# Patient Record
Sex: FEMALE | Race: WHITE | NOT HISPANIC OR LATINO | RURAL
[De-identification: names, ages, dates, MRNs, and addresses within clinical notes are randomized per-mention and may not be internally consistent; named-entity substitution may affect disease eponyms.]

---

## 2021-01-11 LAB
PAP RECOMMENDATION EXT: NORMAL
PAP SMEAR: NORMAL

## 2023-01-03 ENCOUNTER — HOSPITAL ENCOUNTER (EMERGENCY)
Facility: HOSPITAL | Age: 28
Discharge: HOME | End: 2023-01-03
Attending: EMERGENCY MEDICINE

## 2023-01-03 VITALS
OXYGEN SATURATION: 96 % | DIASTOLIC BLOOD PRESSURE: 91 MMHG | RESPIRATION RATE: 14 BRPM | SYSTOLIC BLOOD PRESSURE: 140 MMHG | BODY MASS INDEX: 42.52 KG/M2 | TEMPERATURE: 98 F | WEIGHT: 240 LBS | HEIGHT: 63 IN | HEART RATE: 66 BPM

## 2023-01-03 DIAGNOSIS — R07.9 CHEST PAIN, UNSPECIFIED TYPE: ICD-10-CM

## 2023-01-03 DIAGNOSIS — F41.9 ANXIETY: ICD-10-CM

## 2023-01-03 DIAGNOSIS — F32.A DEPRESSION, UNSPECIFIED DEPRESSION TYPE: Primary | ICD-10-CM

## 2023-01-03 DIAGNOSIS — N39.0 URINARY TRACT INFECTION WITHOUT HEMATURIA, SITE UNSPECIFIED: ICD-10-CM

## 2023-01-03 LAB
ALBUMIN SERPL BCP-MCNC: 3.7 G/DL (ref 3.5–5)
ALBUMIN/GLOB SERPL: 0.9 {RATIO}
ALP SERPL-CCNC: 73 U/L (ref 37–98)
ALT SERPL W P-5'-P-CCNC: 43 U/L (ref 13–56)
ANION GAP SERPL CALCULATED.3IONS-SCNC: 9 MMOL/L (ref 7–16)
AST SERPL W P-5'-P-CCNC: 32 U/L (ref 15–37)
B-HCG UR QL: NEGATIVE
BACTERIA #/AREA URNS HPF: ABNORMAL /HPF
BASOPHILS # BLD AUTO: 0.05 K/UL (ref 0–0.2)
BASOPHILS NFR BLD AUTO: 0.5 % (ref 0–1)
BILIRUB SERPL-MCNC: 0.2 MG/DL (ref ?–1.2)
BILIRUB UR QL STRIP: NEGATIVE
BUN SERPL-MCNC: 8 MG/DL (ref 7–18)
BUN/CREAT SERPL: 15 (ref 6–20)
CALCIUM SERPL-MCNC: 9.3 MG/DL (ref 8.5–10.1)
CHLORIDE SERPL-SCNC: 105 MMOL/L (ref 98–107)
CLARITY UR: ABNORMAL
CO2 SERPL-SCNC: 31 MMOL/L (ref 21–32)
COLOR UR: ABNORMAL
CREAT SERPL-MCNC: 0.55 MG/DL (ref 0.55–1.02)
CTP QC/QA: YES
D DIMER PPP FEU-MCNC: 0.38 ΜG/ML (ref 0–0.47)
DIFFERENTIAL METHOD BLD: ABNORMAL
EGFR (NO RACE VARIABLE) (RUSH/TITUS): 129 ML/MIN/1.73M²
EOSINOPHIL # BLD AUTO: 0.24 K/UL (ref 0–0.5)
EOSINOPHIL NFR BLD AUTO: 2.3 % (ref 1–4)
ERYTHROCYTE [DISTWIDTH] IN BLOOD BY AUTOMATED COUNT: 12.7 % (ref 11.5–14.5)
GLOBULIN SER-MCNC: 3.9 G/DL (ref 2–4)
GLUCOSE SERPL-MCNC: 88 MG/DL (ref 74–106)
GLUCOSE UR STRIP-MCNC: NORMAL MG/DL
HCT VFR BLD AUTO: 39 % (ref 38–47)
HGB BLD-MCNC: 13.1 G/DL (ref 12–16)
IMM GRANULOCYTES # BLD AUTO: 0.01 K/UL (ref 0–0.04)
IMM GRANULOCYTES NFR BLD: 0.1 % (ref 0–0.4)
KETONES UR STRIP-SCNC: NEGATIVE MG/DL
LEUKOCYTE ESTERASE UR QL STRIP: ABNORMAL
LYMPHOCYTES # BLD AUTO: 3.73 K/UL (ref 1–4.8)
LYMPHOCYTES NFR BLD AUTO: 35.9 % (ref 27–41)
MCH RBC QN AUTO: 30 PG (ref 27–31)
MCHC RBC AUTO-ENTMCNC: 33.6 G/DL (ref 32–36)
MCV RBC AUTO: 89.4 FL (ref 80–96)
MONOCYTES # BLD AUTO: 0.68 K/UL (ref 0–0.8)
MONOCYTES NFR BLD AUTO: 6.5 % (ref 2–6)
MPC BLD CALC-MCNC: 9.6 FL (ref 9.4–12.4)
MUCOUS THREADS #/AREA URNS HPF: ABNORMAL /HPF
NEUTROPHILS # BLD AUTO: 5.69 K/UL (ref 1.8–7.7)
NEUTROPHILS NFR BLD AUTO: 54.7 % (ref 53–65)
NITRITE UR QL STRIP: NEGATIVE
NRBC # BLD AUTO: 0 X10E3/UL
NRBC, AUTO (.00): 0 %
PH UR STRIP: 7 PH UNITS
PLATELET # BLD AUTO: 373 K/UL (ref 150–400)
POTASSIUM SERPL-SCNC: 3.9 MMOL/L (ref 3.5–5.1)
PROT SERPL-MCNC: 7.6 G/DL (ref 6.4–8.2)
PROT UR QL STRIP: 10
RBC # BLD AUTO: 4.36 M/UL (ref 4.2–5.4)
RBC # UR STRIP: ABNORMAL /UL
RBC #/AREA URNS HPF: ABNORMAL /HPF
SODIUM SERPL-SCNC: 141 MMOL/L (ref 136–145)
SP GR UR STRIP: 1
SQUAMOUS #/AREA URNS LPF: ABNORMAL /LPF
T4 FREE SERPL-MCNC: 0.72 NG/DL (ref 0.76–1.46)
TRICHOMONAS #/AREA URNS HPF: ABNORMAL /HPF
TROPONIN I SERPL HS-MCNC: 4.7 PG/ML
TSH SERPL DL<=0.005 MIU/L-ACNC: 1.68 UIU/ML (ref 0.36–3.74)
UROBILINOGEN UR STRIP-ACNC: NORMAL MG/DL
WBC # BLD AUTO: 10.4 K/UL (ref 4.5–11)
WBC #/AREA URNS HPF: ABNORMAL /HPF
YEAST #/AREA URNS HPF: ABNORMAL /HPF

## 2023-01-03 PROCEDURE — 85025 COMPLETE CBC W/AUTO DIFF WBC: CPT | Performed by: EMERGENCY MEDICINE

## 2023-01-03 PROCEDURE — 84443 ASSAY THYROID STIM HORMONE: CPT | Performed by: EMERGENCY MEDICINE

## 2023-01-03 PROCEDURE — 85379 FIBRIN DEGRADATION QUANT: CPT | Performed by: EMERGENCY MEDICINE

## 2023-01-03 PROCEDURE — 84439 ASSAY OF FREE THYROXINE: CPT | Performed by: EMERGENCY MEDICINE

## 2023-01-03 PROCEDURE — 96360 HYDRATION IV INFUSION INIT: CPT

## 2023-01-03 PROCEDURE — 81001 URINALYSIS AUTO W/SCOPE: CPT | Performed by: EMERGENCY MEDICINE

## 2023-01-03 PROCEDURE — 99284 EMERGENCY DEPT VISIT MOD MDM: CPT | Mod: 25

## 2023-01-03 PROCEDURE — 36415 COLL VENOUS BLD VENIPUNCTURE: CPT | Performed by: EMERGENCY MEDICINE

## 2023-01-03 PROCEDURE — 81025 URINE PREGNANCY TEST: CPT | Performed by: EMERGENCY MEDICINE

## 2023-01-03 PROCEDURE — 25000003 PHARM REV CODE 250: Performed by: EMERGENCY MEDICINE

## 2023-01-03 PROCEDURE — 80053 COMPREHEN METABOLIC PANEL: CPT | Performed by: EMERGENCY MEDICINE

## 2023-01-03 PROCEDURE — 99284 PR EMERGENCY DEPT VISIT,LEVEL IV: ICD-10-PCS | Mod: ,,, | Performed by: EMERGENCY MEDICINE

## 2023-01-03 PROCEDURE — 99284 EMERGENCY DEPT VISIT MOD MDM: CPT | Mod: ,,, | Performed by: EMERGENCY MEDICINE

## 2023-01-03 PROCEDURE — 84484 ASSAY OF TROPONIN QUANT: CPT | Performed by: EMERGENCY MEDICINE

## 2023-01-03 RX ORDER — CEPHALEXIN 500 MG/1
500 CAPSULE ORAL 2 TIMES DAILY
Qty: 14 CAPSULE | Refills: 0 | Status: SHIPPED | OUTPATIENT
Start: 2023-01-03 | End: 2023-01-10

## 2023-01-03 RX ORDER — BUSPIRONE HYDROCHLORIDE 5 MG/1
5 TABLET ORAL 2 TIMES DAILY
Qty: 60 TABLET | Refills: 1 | Status: SHIPPED | OUTPATIENT
Start: 2023-01-03 | End: 2023-09-05 | Stop reason: SDUPTHER

## 2023-01-03 RX ORDER — HYDROXYZINE HYDROCHLORIDE 25 MG/1
25 TABLET, FILM COATED ORAL 3 TIMES DAILY PRN
Qty: 30 TABLET | Refills: 2 | Status: SHIPPED | OUTPATIENT
Start: 2023-01-03

## 2023-01-03 RX ORDER — CLONAZEPAM 0.5 MG/1
0.5 TABLET ORAL
Status: COMPLETED | OUTPATIENT
Start: 2023-01-03 | End: 2023-01-03

## 2023-01-03 RX ORDER — PROPRANOLOL HYDROCHLORIDE 10 MG/1
10 TABLET ORAL 3 TIMES DAILY PRN
Qty: 90 TABLET | Refills: 11 | Status: SHIPPED | OUTPATIENT
Start: 2023-01-03 | End: 2023-09-05 | Stop reason: SDUPTHER

## 2023-01-03 RX ADMIN — SODIUM CHLORIDE 1000 ML: 9 INJECTION, SOLUTION INTRAVENOUS at 12:01

## 2023-01-03 RX ADMIN — CLONAZEPAM 0.5 MG: 0.5 TABLET ORAL at 12:01

## 2023-01-03 NOTE — DISCHARGE INSTRUCTIONS
Follow-up with primary care with Cardiology.  Talk about her chest pain and your tachycardia spells.  Also follow-up with primary care.    Start BuSpar to help with your mood.    Use propranolol as needed to help with tachycardia with anxiety.  Similarly use hydroxyzine as needed.    Prescribing antibiotic for some evidence of urinary tract infection

## 2023-01-03 NOTE — Clinical Note
"Ashley"Silverio Gonzalez was seen and treated in our emergency department on 1/3/2023.  She may return to work on 01/05/2023.       If you have any questions or concerns, please don't hesitate to call.      EMMA Goldberg    "

## 2023-01-03 NOTE — ED PROVIDER NOTES
Encounter Date: 1/3/2023    SCRIBE #1 NOTE: I, Jyothi Yousif, am scribing for, and in the presence of,  Raz Banks MD. I have scribed the entire note.     History     Chief Complaint   Patient presents with    Chest Pain     Patient is a 27 y.o. female who presents to the emergency department with complaints of chest pain. Patient explains that she has recently been experiencing chest pain and notes that her heart rate greatly increases when standing. Patient describes her pain as a tight, squeezing sensation in addition to sharp stabbing pain. Patient also notes that shortness of breath, light-headedness, and leg swelling. Patient notes job related stress. No other symptoms were reported.    The history is provided by the patient. No  was used.   Review of patient's allergies indicates:   Allergen Reactions    Red (food color) Hives and Itching    Yellow dye Hives and Itching    Quetiapine Anxiety, Diarrhea, Nausea And Vomiting, Other (See Comments), Photosensitivity and Rash     Other reaction(s): Headache     No past medical history on file.  No past surgical history on file.  No family history on file.     Review of Systems   Eyes: Negative.    Respiratory:  Positive for shortness of breath.    Cardiovascular:  Positive for chest pain.   Endocrine: Negative.    Skin: Negative.    Allergic/Immunologic: Negative.    Neurological:  Positive for light-headedness and numbness.   Hematological: Negative.    Psychiatric/Behavioral: Negative.     All other systems reviewed and are negative.    Physical Exam     Initial Vitals [01/03/23 1124]   BP Pulse Resp Temp SpO2   (!) 154/107 (!) 128 (!) 22 97.9 °F (36.6 °C) 100 %      MAP       --         Physical Exam    Nursing note and vitals reviewed.  Constitutional: She appears well-developed and well-nourished.   HENT:   Head: Normocephalic and atraumatic.   Cardiovascular:  Normal rate.           Pulmonary/Chest: Breath sounds normal.    Abdominal: Bowel sounds are normal.     Neurological: She is alert and oriented to person, place, and time.   Skin: Skin is warm and dry.   Psychiatric: She has a normal mood and affect. Thought content normal.       ED Course   Procedures  Labs Reviewed   URINALYSIS, REFLEX TO URINE CULTURE - Abnormal; Notable for the following components:       Result Value    Color, UA Pink (*)     Leukocytes, UA Small (*)     Protein, UA 10 (*)     Blood, UA Large (*)     All other components within normal limits   CBC WITH DIFFERENTIAL - Abnormal; Notable for the following components:    Monocytes % 6.5 (*)     All other components within normal limits   URINALYSIS, MICROSCOPIC - Abnormal; Notable for the following components:    RBC, UA 3-5 (*)     Bacteria, UA Few (*)     Squamous Epithelial Cells, UA Few (*)     All other components within normal limits   TROPONIN I - Normal   D DIMER, QUANTITATIVE - Normal   CBC W/ AUTO DIFFERENTIAL    Narrative:     The following orders were created for panel order CBC auto differential.  Procedure                               Abnormality         Status                     ---------                               -----------         ------                     CBC with Differential[[id number]]        Abnormal            Final result                 Please view results for these tests on the individual orders.   COMPREHENSIVE METABOLIC PANEL   TSH   T4, FREE   POCT URINE PREGNANCY          Imaging Results    None          Medications   clonazePAM tablet 0.5 mg (0.5 mg Oral Given 1/3/23 1250)   sodium chloride 0.9% bolus 1,000 mL 1,000 mL (1,000 mLs Intravenous New Bag 1/3/23 1250)                Attending Attestation:           Physician Attestation for Scribe:  Physician Attestation Statement for Scribe #1: I, Raz Banks MD, reviewed documentation, as scribed by Jyothi Dodd in my presence, and it is both accurate and complete.           ED Course as of 01/03/23 7333   Fili Amin  03, 2023   1351 Patient presents with some lightheadedness on standing and tachycardia.  Patient also very anxious.  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  Patient recently moved here so she is in a new area.  She had some chest tightness with EKG showed normal sinus rhythm no ST elevation.  Also high sensitivity troponin is normal so myocardial infarction very unlikely.  D-dimer also negative so very unlikely to be pulmonary embolism.  Chest x-ray shows no acute abnormality white blood cell count normal so unlikely to be pneumonia.  She did have some mild dysuria as well as few bacteria in the urine and small amount of leukocytes will be treated empirically for urinary tract infection with outpatient antibiotics.  Patient improved in the ED after being treated with Klonopin.  Will prescribe propranolol and hydroxyzine as needed for anxiety.  Will have her follow-up with Cardiology due to her POTS like presentation and her chest pain.  Also will have her follow up with primary care and will start BuSpar for her mood.  She voiced understanding to return if symptoms worsen or new symptoms develop. [PK]      ED Course User Index  [PK] Raz Banks MD                 Clinical Impression:   Final diagnoses:  [F32.A] Depression, unspecified depression type (Primary)  [F41.9] Anxiety  [R07.9] Chest pain, unspecified type  [N39.0] Urinary tract infection without hematuria, site unspecified        ED Disposition Condition    Discharge Stable          ED Prescriptions       Medication Sig Dispense Start Date End Date Auth. Provider    propranoloL (INDERAL) 10 MG tablet Take 1 tablet (10 mg total) by mouth 3 (three) times daily as needed (anxiety). 90 tablet 1/3/2023 1/3/2024 Raz Banks MD    hydrOXYzine HCL (ATARAX) 25 MG tablet Take 1 tablet (25 mg total) by mouth 3 (three) times daily as needed for Itching or Anxiety. 30 tablet 1/3/2023 -- Raz Banks MD     busPIRone (BUSPAR) 5 MG Tab Take 1 tablet (5 mg total) by mouth 2 (two) times daily. 60 tablet 1/3/2023 1/3/2024 Raz Banks MD    cephALEXin (KEFLEX) 500 MG capsule Take 1 capsule (500 mg total) by mouth 2 (two) times a day. for 7 days 14 capsule 1/3/2023 1/10/2023 Raz Banks MD          Follow-up Information       Follow up With Specialties Details Why Contact Info    Anil Rosas DO Family Medicine, Emergency Medicine Schedule an appointment as soon as possible for a visit  As needed [address] [facility] [address]  [phone]      [facility] - Emergency Department Emergency Medicine Go to  As needed, If symptoms worsen [address] [place] [id number] [phone]             Raz Banks MD  01/03/23 4885

## 2023-01-04 ENCOUNTER — TELEPHONE (OUTPATIENT)
Dept: EMERGENCY MEDICINE | Facility: HOSPITAL | Age: 28
End: 2023-01-04

## 2023-01-04 ENCOUNTER — PATIENT MESSAGE (OUTPATIENT)
Dept: EMERGENCY MEDICINE | Facility: HOSPITAL | Age: 28
End: 2023-01-04

## 2023-01-04 NOTE — TELEPHONE ENCOUNTER
----- Message from CARLOS Solis sent at 1/4/2023  8:13 AM CST -----  Please have the patient follow up with her PCP regarding TSH

## 2023-03-15 ENCOUNTER — OFFICE VISIT (OUTPATIENT)
Dept: CARDIOLOGY | Facility: CLINIC | Age: 28
End: 2023-03-15
Payer: COMMERCIAL

## 2023-03-15 VITALS
BODY MASS INDEX: 45 KG/M2 | HEART RATE: 82 BPM | RESPIRATION RATE: 18 BRPM | SYSTOLIC BLOOD PRESSURE: 120 MMHG | HEIGHT: 63 IN | WEIGHT: 254 LBS | DIASTOLIC BLOOD PRESSURE: 85 MMHG

## 2023-03-15 DIAGNOSIS — R07.9 CHEST PAIN, UNSPECIFIED TYPE: ICD-10-CM

## 2023-03-15 DIAGNOSIS — R00.2 PALPITATIONS: Primary | ICD-10-CM

## 2023-03-15 PROCEDURE — 99204 OFFICE O/P NEW MOD 45 MIN: CPT | Mod: S$PBB,,, | Performed by: STUDENT IN AN ORGANIZED HEALTH CARE EDUCATION/TRAINING PROGRAM

## 2023-03-15 PROCEDURE — 3074F SYST BP LT 130 MM HG: CPT | Mod: ,,, | Performed by: STUDENT IN AN ORGANIZED HEALTH CARE EDUCATION/TRAINING PROGRAM

## 2023-03-15 PROCEDURE — 3079F PR MOST RECENT DIASTOLIC BLOOD PRESSURE 80-89 MM HG: ICD-10-PCS | Mod: ,,, | Performed by: STUDENT IN AN ORGANIZED HEALTH CARE EDUCATION/TRAINING PROGRAM

## 2023-03-15 PROCEDURE — 1159F MED LIST DOCD IN RCRD: CPT | Mod: ,,, | Performed by: STUDENT IN AN ORGANIZED HEALTH CARE EDUCATION/TRAINING PROGRAM

## 2023-03-15 PROCEDURE — 99204 PR OFFICE/OUTPT VISIT, NEW, LEVL IV, 45-59 MIN: ICD-10-PCS | Mod: S$PBB,,, | Performed by: STUDENT IN AN ORGANIZED HEALTH CARE EDUCATION/TRAINING PROGRAM

## 2023-03-15 PROCEDURE — 3008F PR BODY MASS INDEX (BMI) DOCUMENTED: ICD-10-PCS | Mod: ,,, | Performed by: STUDENT IN AN ORGANIZED HEALTH CARE EDUCATION/TRAINING PROGRAM

## 2023-03-15 PROCEDURE — 1159F PR MEDICATION LIST DOCUMENTED IN MEDICAL RECORD: ICD-10-PCS | Mod: ,,, | Performed by: STUDENT IN AN ORGANIZED HEALTH CARE EDUCATION/TRAINING PROGRAM

## 2023-03-15 PROCEDURE — 3008F BODY MASS INDEX DOCD: CPT | Mod: ,,, | Performed by: STUDENT IN AN ORGANIZED HEALTH CARE EDUCATION/TRAINING PROGRAM

## 2023-03-15 PROCEDURE — 3074F PR MOST RECENT SYSTOLIC BLOOD PRESSURE < 130 MM HG: ICD-10-PCS | Mod: ,,, | Performed by: STUDENT IN AN ORGANIZED HEALTH CARE EDUCATION/TRAINING PROGRAM

## 2023-03-15 PROCEDURE — 99214 OFFICE O/P EST MOD 30 MIN: CPT | Mod: PBBFAC | Performed by: STUDENT IN AN ORGANIZED HEALTH CARE EDUCATION/TRAINING PROGRAM

## 2023-03-15 PROCEDURE — 3079F DIAST BP 80-89 MM HG: CPT | Mod: ,,, | Performed by: STUDENT IN AN ORGANIZED HEALTH CARE EDUCATION/TRAINING PROGRAM

## 2023-03-15 PROCEDURE — 93246 EXT ECG>7D<15D RECORDING: CPT | Performed by: STUDENT IN AN ORGANIZED HEALTH CARE EDUCATION/TRAINING PROGRAM

## 2023-03-15 RX ORDER — AMITRIPTYLINE HYDROCHLORIDE 10 MG/1
10 TABLET, FILM COATED ORAL NIGHTLY
COMMUNITY
End: 2023-09-13

## 2023-03-15 NOTE — PROGRESS NOTES
PCP: Primary Doctor No    Referring Provider:     Subjective:   Ashley Gonzalez is a 27 y.o. female with hx of obesity, sleep disorder  who presents for evaluation of chest pain and palpitations.     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   Was in the ED having chest pain, SOB and ringing sensation in the ears  Frequency of symptoms of increased. Reports having COVID x 4 and feels it maybe related  Also start Elavil for insomnia (known to cause tachycardia)    Fhx: hx of congenital heart disease  Shx: ex smoker, denies etoh or drug use    EKG 1/3/23 - NSR with sinus arrhyhtmia.       Lab Results   Component Value Date     01/03/2023    K 3.9 01/03/2023     01/03/2023    CO2 31 01/03/2023    BUN 8 01/03/2023    CREATININE 0.55 01/03/2023    CALCIUM 9.3 01/03/2023    ANIONGAP 9 01/03/2023    ESTGFRAFRICA >60 05/07/2022    EGFRNONAA >60 05/07/2022       No results found for: CHOL  No results found for: HDL  No results found for: LDLCALC  No results found for: TRIG  No results found for: CHOLHDL    Lab Results   Component Value Date    WBC 10.40 01/03/2023    HGB 13.1 01/03/2023    HCT 39.0 01/03/2023    MCV 89.4 01/03/2023     01/03/2023           Current Outpatient Medications:     amitriptyline (ELAVIL) 10 MG tablet, Take 10 mg by mouth every evening., Disp: , Rfl:     busPIRone (BUSPAR) 5 MG Tab, Take 1 tablet (5 mg total) by mouth 2 (two) times daily., Disp: 60 tablet, Rfl: 1    hydrOXYzine HCL (ATARAX) 25 MG tablet, Take 1 tablet (25 mg total) by mouth 3 (three) times daily as needed for Itching or Anxiety., Disp: 30 tablet, Rfl: 2    propranoloL (INDERAL) 10 MG tablet, Take 1 tablet (10 mg total) by mouth 3 (three) times daily as needed (anxiety)., Disp: 90 tablet, Rfl: 11    Review of Systems   Respiratory:  Positive for shortness of breath. Negative for cough.    Cardiovascular:  Positive for chest pain and palpitations. Negative for orthopnea,  "claudication, leg swelling and PND.       Objective:   /85   Pulse 82   Resp 18   Ht 5' 3" (1.6 m)   Wt 115.2 kg (254 lb)   BMI 44.99 kg/m²     Physical Exam  Constitutional:       Appearance: Normal appearance. She is obese.   Cardiovascular:      Rate and Rhythm: Normal rate and regular rhythm.      Pulses: Normal pulses.      Heart sounds: Normal heart sounds. No murmur heard.  Pulmonary:      Effort: Pulmonary effort is normal.      Breath sounds: Normal breath sounds.   Musculoskeletal:         General: No swelling.   Neurological:      Mental Status: She is alert and oriented to person, place, and time.         Assessment:     1. Palpitations  Cardiac Monitor - 3-15 Day Adult (Cupid Only)      2. Chest pain, unspecified type  Ambulatory referral/consult to Cardiology    Echo            Plan:   Chest pain  Atypical. EKG unremarkable and troponin negative  Will get ECHO  ? POTs    Palpitations  TSH normal  Will get 14 day event monitor          "

## 2023-04-05 ENCOUNTER — HOSPITAL ENCOUNTER (OUTPATIENT)
Dept: CARDIOLOGY | Facility: HOSPITAL | Age: 28
Discharge: HOME | End: 2023-04-05
Attending: STUDENT IN AN ORGANIZED HEALTH CARE EDUCATION/TRAINING PROGRAM
Payer: COMMERCIAL

## 2023-04-05 VITALS — WEIGHT: 254 LBS | HEIGHT: 63 IN | BODY MASS INDEX: 45 KG/M2

## 2023-04-05 DIAGNOSIS — R07.9 CHEST PAIN, UNSPECIFIED TYPE: ICD-10-CM

## 2023-04-05 PROCEDURE — 93306 ECHO (CUPID ONLY): ICD-10-PCS | Mod: 26,,, | Performed by: STUDENT IN AN ORGANIZED HEALTH CARE EDUCATION/TRAINING PROGRAM

## 2023-04-05 PROCEDURE — 93306 TTE W/DOPPLER COMPLETE: CPT

## 2023-04-05 PROCEDURE — 93306 TTE W/DOPPLER COMPLETE: CPT | Mod: 26,,, | Performed by: STUDENT IN AN ORGANIZED HEALTH CARE EDUCATION/TRAINING PROGRAM

## 2023-04-06 LAB
AORTIC VALVE CUSP SEPERATION: 1.78 CM
AV INDEX (PROSTH): 0.7
AV MEAN GRADIENT: 4 MMHG
AV PEAK GRADIENT: 7 MMHG
AV VALVE AREA: 2.21 CM2
BSA FOR ECHO PROCEDURE: 2.26 M2
CV ECHO LV RWT: 0.58 CM
DOP CALC AO PEAK VEL: 1.3 M/S
DOP CALC AO VTI: 24.24 CM
DOP CALC LVOT AREA: 3.1 CM2
DOP CALC LVOT DIAMETER: 2 CM
DOP CALC LVOT STROKE VOLUME: 53.47 CM3
DOP CALCLVOT PEAK VEL VTI: 17.03 CM
E WAVE DECELERATION TIME: 217 MSEC
E/A RATIO: 1.05
E/E' RATIO: 4.85 M/S
ECHO LV POSTERIOR WALL: 1.04 CM (ref 0.6–1.1)
EJECTION FRACTION: 60 %
FRACTIONAL SHORTENING: 40 % (ref 28–44)
INTERVENTRICULAR SEPTUM: 1.12 CM (ref 0.6–1.1)
IVC DIAMETER: 1.12 CM
LEFT ATRIUM SIZE: 2.68 CM
LEFT INTERNAL DIMENSION IN SYSTOLE: 2.14 CM (ref 2.1–4)
LEFT VENTRICLE DIASTOLIC VOLUME INDEX: 25.09 ML/M2
LEFT VENTRICLE DIASTOLIC VOLUME: 53.7 ML
LEFT VENTRICLE MASS INDEX: 56 G/M2
LEFT VENTRICLE SYSTOLIC VOLUME INDEX: 7.1 ML/M2
LEFT VENTRICLE SYSTOLIC VOLUME: 15.1 ML
LEFT VENTRICULAR INTERNAL DIMENSION IN DIASTOLE: 3.58 CM (ref 3.5–6)
LEFT VENTRICULAR MASS: 119.77 G
LV LATERAL E/E' RATIO: 4.2 M/S
LV SEPTAL E/E' RATIO: 5.73 M/S
MV PEAK A VEL: 0.6 M/S
MV PEAK E VEL: 0.63 M/S
PISA TR MAX VEL: 2.37 M/S
RA PRESSURE: 3 MMHG
RA WIDTH: 3.23 CM
RIGHT ATRIAL AREA: 11.8 CM2
RIGHT VENTRICULAR END-DIASTOLIC DIMENSION: 2.96 CM
RIGHT VENTRICULAR LENGTH IN DIASTOLE (APICAL 4-CHAMBER VIEW): 6.66 CM
RV MID DIAMA: 1.71 CM
TDI LATERAL: 0.15 M/S
TDI SEPTAL: 0.11 M/S
TDI: 0.13 M/S
TR MAX PG: 22 MMHG
TV REST PULMONARY ARTERY PRESSURE: 25 MMHG

## 2023-04-26 ENCOUNTER — TELEPHONE (OUTPATIENT)
Dept: CARDIOLOGY | Facility: CLINIC | Age: 28
End: 2023-04-26
Payer: COMMERCIAL

## 2023-04-26 PROCEDURE — 93248 EXT ECG>7D<15D REV&INTERPJ: CPT | Mod: ,,, | Performed by: STUDENT IN AN ORGANIZED HEALTH CARE EDUCATION/TRAINING PROGRAM

## 2023-04-26 PROCEDURE — 93248 PR EXT ECG, CONT, > 7 DAYS <= 15 DAYS, REVIEW W/INT: ICD-10-PCS | Mod: ,,, | Performed by: STUDENT IN AN ORGANIZED HEALTH CARE EDUCATION/TRAINING PROGRAM

## 2023-04-26 NOTE — TELEPHONE ENCOUNTER
Pt. Notified ZIO results NSR no significant arrhythmia or heart blocks pt. Events NSR per Dr. Biswas. Pt. Voiced understanding.

## 2023-06-13 ENCOUNTER — OFFICE VISIT (OUTPATIENT)
Dept: FAMILY MEDICINE | Facility: CLINIC | Age: 28
End: 2023-06-13
Payer: COMMERCIAL

## 2023-06-13 VITALS
HEART RATE: 76 BPM | WEIGHT: 248.38 LBS | OXYGEN SATURATION: 98 % | TEMPERATURE: 97 F | HEIGHT: 63 IN | BODY MASS INDEX: 44.01 KG/M2 | DIASTOLIC BLOOD PRESSURE: 70 MMHG | SYSTOLIC BLOOD PRESSURE: 109 MMHG | RESPIRATION RATE: 18 BRPM

## 2023-06-13 DIAGNOSIS — F41.9 ANXIETY: ICD-10-CM

## 2023-06-13 DIAGNOSIS — E66.01 CLASS 3 SEVERE OBESITY WITH BODY MASS INDEX (BMI) OF 40.0 TO 44.9 IN ADULT, UNSPECIFIED OBESITY TYPE, UNSPECIFIED WHETHER SERIOUS COMORBIDITY PRESENT: Primary | ICD-10-CM

## 2023-06-13 PROCEDURE — 3074F PR MOST RECENT SYSTOLIC BLOOD PRESSURE < 130 MM HG: ICD-10-PCS | Mod: ,,, | Performed by: INTERNAL MEDICINE

## 2023-06-13 PROCEDURE — 3008F PR BODY MASS INDEX (BMI) DOCUMENTED: ICD-10-PCS | Mod: ,,, | Performed by: INTERNAL MEDICINE

## 2023-06-13 PROCEDURE — 1160F RVW MEDS BY RX/DR IN RCRD: CPT | Mod: ,,, | Performed by: INTERNAL MEDICINE

## 2023-06-13 PROCEDURE — 3074F SYST BP LT 130 MM HG: CPT | Mod: ,,, | Performed by: INTERNAL MEDICINE

## 2023-06-13 PROCEDURE — 3008F BODY MASS INDEX DOCD: CPT | Mod: ,,, | Performed by: INTERNAL MEDICINE

## 2023-06-13 PROCEDURE — 1159F MED LIST DOCD IN RCRD: CPT | Mod: ,,, | Performed by: INTERNAL MEDICINE

## 2023-06-13 PROCEDURE — 1160F PR REVIEW ALL MEDS BY PRESCRIBER/CLIN PHARMACIST DOCUMENTED: ICD-10-PCS | Mod: ,,, | Performed by: INTERNAL MEDICINE

## 2023-06-13 PROCEDURE — 3078F DIAST BP <80 MM HG: CPT | Mod: ,,, | Performed by: INTERNAL MEDICINE

## 2023-06-13 PROCEDURE — 3078F PR MOST RECENT DIASTOLIC BLOOD PRESSURE < 80 MM HG: ICD-10-PCS | Mod: ,,, | Performed by: INTERNAL MEDICINE

## 2023-06-13 PROCEDURE — 1159F PR MEDICATION LIST DOCUMENTED IN MEDICAL RECORD: ICD-10-PCS | Mod: ,,, | Performed by: INTERNAL MEDICINE

## 2023-06-13 PROCEDURE — 99214 PR OFFICE/OUTPT VISIT, EST, LEVL IV, 30-39 MIN: ICD-10-PCS | Mod: ,,, | Performed by: INTERNAL MEDICINE

## 2023-06-13 PROCEDURE — 99214 OFFICE O/P EST MOD 30 MIN: CPT | Mod: ,,, | Performed by: INTERNAL MEDICINE

## 2023-06-13 NOTE — LETTER
Name: Ashley Gonzalez   Age: 27 y.o.  Address: [address] [place] [address]  : 1995    Phone: [phone]   Company: [employer] reference to record [employer]        Date of Exam: 2023    Type of Exam: [] Pre-Placement  []  Periodic  []  Return to Work     Patient History:  No past medical history on file.  No family history on file.  No past surgical history on file.      No flowsheet data found.   .    WARNING; PURSUANT TO LSA-RS 23:1208.1, Ashley Gonzalez UNDERSTANDS THAT THE FAILURE TO ANSWER TRUTHFULLY ANY OF THE ABOVE QUESTIONS MAY RESULT IN FORFEITURE OF ANY RIGHT THE PATIENT OR ANY DEPENDENTS MAY HAVE TO WORKERS' COMPENSATION BENEFITS, INCLUDING MEDICAL TREATMENT AND EXPENSES.  Patient hereby authorizes any and all information gained from this examination to be transmitted to the above company.  Patient has not withheld information or given any false data and understands that any recommendations to the above named company are based on the limited medical studies that the provider has been authorized to perform.  Patient understands that this is a limited examination and does not eliminate all possibilities of physical impairment, disease, or capabilities.  Patient will not hold the examining physician responsible for any physical condition, which may or may not be exposed by this limited examination.    Patient consent: {YES NO:88857}    Job title: Data Unavailable    Outpatient Encounter Medications as of 2023   Medication Sig Dispense Refill    amitriptyline (ELAVIL) 10 MG tablet Take 10 mg by mouth every evening.      busPIRone (BUSPAR) 5 MG Tab Take 1 tablet (5 mg total) by mouth 2 (two) times daily. 60 tablet 1    hydrOXYzine HCL (ATARAX) 25 MG tablet Take 1 tablet (25 mg total) by mouth 3 (three) times daily as needed for Itching or Anxiety. 30 tablet 2    propranoloL (INDERAL) 10 MG tablet Take 1 tablet (10 mg total) by mouth 3 (three) times daily as needed (anxiety). 90 tablet  11     No facility-administered encounter medications on file as of 6/13/2023.        Allergic to any medication? is allergic to red (food color), yellow dye, and quetiapine.    Any auto accidents or injuries? {YES NO:99685}    Has a doctor ever treated you for any back, neck, knee, or other orthopedic problems? {YES/NO ORTHO TREATMENT:95069}    Social History     Tobacco Use   Smoking Status Not on file   Smokeless Tobacco Not on file       Social History     Substance and Sexual Activity   Alcohol Use Not on file       Last menstrual period: No LMP recorded.    Vitals:    06/13/23 1045   BP: 109/70   Pulse: 76   Resp: 18   Temp: 97.4 °F (36.3 °C)       Urine:   No results found for: GLUCOSEUR    No results found for: PROTEINUR    Specific Gravity, UA   Date Value Ref Range Status   01/03/2023 1.004 <=1.030 Final       Other: ***        No results found.    @[facility]@  @[facility]@        [unfilled]      Ancillary:  No orders of the defined types were placed in this encounter.      Strength & Flexibility Score:  {Desc; good/fair/bad:22255}    LIFT/STRENGTH/CARRYING RESULTS:  {Work restrictions:88933}    Immunizations:    There is no immunization history on file for this patient.    Details of abnormal or significant findings on physical exam:  ***    Examined by: Marisela Manriquez LPN

## 2023-06-13 NOTE — PROGRESS NOTES
Care gaps discussed with patient. Patient refused hep c screening. Patient states she is up to date on Covid vaccine. Patient states she is up to date on HIV screening. Patient states her last pap smear was a year ago with . Patient states she is up to date on tetanus vaccine.

## 2023-06-13 NOTE — LETTER
June 13, 2023      [facility]  [address] [facility] [address]  Phone: [phone]  Fax: [phone]       Patient: Ashley Gonzalez   YOB: 1995  Date of Visit: 06/13/2023    To Whom It May Concern:    Tish Gonzalez  was at [facility] on 06/13/2023. The patient may return to work/school on 06/14/2023 with no restrictions. If you have any questions or concerns, or if I can be of further assistance, please do not hesitate to contact me.    Sincerely,    Marisela Manriquez LPN

## 2023-06-14 ENCOUNTER — PATIENT OUTREACH (OUTPATIENT)
Dept: ADMINISTRATIVE | Facility: HOSPITAL | Age: 28
End: 2023-06-14

## 2023-06-14 NOTE — PROGRESS NOTES
Post visit Population Health review of encounter with date of service  6/13 with Marlon.  Followup prior to note closure.

## 2023-06-14 NOTE — PROGRESS NOTES
06/14/2023   --Chart accessed for: Cervical Cancer Screening  --Care Gaps addressed: Cervical Cancer Screening  Outreach made to patient via Chart Review/Fax   Patient stated pap with Dr. Aguilera last year  Care Everywhere updates requested and reviewed.  Media reports reviewed.  [facility] and [facility] reviewed.  Immunization Database (Immprint/MIXX) reviewed. Vaccinations uploaded: N/A  Requested Pap records from Dr. Aguilera    Health Maintenance Due   Topic Date Due    Hepatitis C Screening  Never done    HIV Screening  Never done    Pap Smear  Never done

## 2023-06-14 NOTE — LETTER
AUTHORIZATION FOR RELEASE OF   CONFIDENTIAL INFORMATION    Dear Dr. Aguilera,    We are seeing Ashley Gonzalez, date of birth 1995, in the clinic at [facility]. Dr. Rick Mason MD is the patient's PCP. Ashley Gonzalez has an outstanding lab/procedure at the time we reviewed her chart. In order to help keep her health information updated, she has authorized us to request the following medical record(s):        (  )  MAMMOGRAM                                      (  )  COLONOSCOPY      ( x )  PAP SMEAR                                          (  )  OUTSIDE LAB RESULTS     (  )  DEXA SCAN                                          (  )  EYE EXAM            (  )  FOOT EXAM                                          (  )  ENTIRE RECORD     (  )  OUTSIDE IMMUNIZATIONS                 (  )  _______________         Please fax records to Juju Manriquez LPN Care Coordinator at [phone].      If you have any questions, please contact Juju at [phone].           Patient Name: Ashley Gonzalez  : 1995  Patient Phone #: [phone]

## 2023-06-16 ENCOUNTER — PATIENT OUTREACH (OUTPATIENT)
Dept: ADMINISTRATIVE | Facility: HOSPITAL | Age: 28
End: 2023-06-16

## 2023-06-16 PROBLEM — E66.01 CLASS 3 SEVERE OBESITY WITH BODY MASS INDEX (BMI) OF 40.0 TO 44.9 IN ADULT: Status: ACTIVE | Noted: 2023-06-16

## 2023-06-16 PROBLEM — F41.9 ANXIETY: Status: ACTIVE | Noted: 2023-06-16

## 2023-06-16 NOTE — PROGRESS NOTES
06/16/2023   Care Gaps addressed: Cervical Cancer Screening; received Pap report  HM updated with external 2021 Pap Report      Health Maintenance Due   Topic Date Due    Hepatitis C Screening  Never done    HIV Screening  Never done

## 2023-06-16 NOTE — PROGRESS NOTES
"Subjective:       Patient ID: Ashley Gonzalez is a 27 y.o. female.    Chief Complaint: Annual Exam    HPI  .  27-year-old female presents today for a physical examination.  Patient overall has no complaints.  Patient does complain of a history of anxiety and also the patient does have obesity.  Fluid the anxiety I will refill theBuSpar 5 mg 1 p.o. q.day.    In regards to obesity, I recommend low-carbohydrate diet, exercise 30 minutes a day, and lifestyle modification.    Otherwise physical exam is within normal limits.    Current Medications:    Current Outpatient Medications:     amitriptyline (ELAVIL) 10 MG tablet, Take 10 mg by mouth every evening., Disp: , Rfl:     busPIRone (BUSPAR) 5 MG Tab, Take 1 tablet (5 mg total) by mouth 2 (two) times daily., Disp: 60 tablet, Rfl: 1    hydrOXYzine HCL (ATARAX) 25 MG tablet, Take 1 tablet (25 mg total) by mouth 3 (three) times daily as needed for Itching or Anxiety., Disp: 30 tablet, Rfl: 2    propranoloL (INDERAL) 10 MG tablet, Take 1 tablet (10 mg total) by mouth 3 (three) times daily as needed (anxiety)., Disp: 90 tablet, Rfl: 11           Review of Systems   Constitutional:  Negative for appetite change, fatigue and fever.   Respiratory:  Negative for shortness of breath.    Cardiovascular:  Negative for chest pain.   Gastrointestinal:  Negative for abdominal pain and constipation.   Endocrine: Negative for polydipsia, polyphagia and polyuria.   Genitourinary:  Negative for difficulty urinating, frequency and hot flashes.   Allergic/Immunologic: Negative for environmental allergies.   Neurological:  Negative for dizziness and light-headedness.   Psychiatric/Behavioral:  Positive for agitation.               Vitals:    06/13/23 1045   BP: 109/70   BP Location: Right arm   Patient Position: Sitting   BP Method: Large (Automatic)   Pulse: 76   Resp: 18   Temp: 97.4 °F (36.3 °C)   TempSrc: Temporal   SpO2: 98%   Weight: 112.7 kg (248 lb 6.4 oz)   Height: 5' 3" (1.6 m)    "     Physical Exam  Vitals and nursing note reviewed.   Constitutional:       Appearance: Normal appearance. She is obese.   Cardiovascular:      Rate and Rhythm: Normal rate and regular rhythm.      Pulses: Normal pulses.      Heart sounds: Normal heart sounds.   Pulmonary:      Effort: Pulmonary effort is normal.      Breath sounds: Normal breath sounds.   Abdominal:      General: Abdomen is flat. Bowel sounds are normal.      Palpations: Abdomen is soft.   Musculoskeletal:         General: Normal range of motion.   Skin:     General: Skin is warm and dry.   Neurological:      General: No focal deficit present.      Mental Status: She is alert and oriented to person, place, and time. Mental status is at baseline.         Last Labs:     No visits with results within 1 Month(s) from this visit.   Latest known visit with results is:   Hospital Outpatient Visit on 04/05/2023   Component Date Value    BSA 04/05/2023 2.26     TDI SEPTAL 04/05/2023 0.11     LV LATERAL E/E' RATIO 04/05/2023 4.20     LV SEPTAL E/E' RATIO 04/05/2023 5.73     IVC diameter 04/05/2023 1.12     RV mid diameter 04/05/2023 1.71     AORTIC VALVE CUSP SEPERA* 04/05/2023 1.78     TDI LATERAL 04/05/2023 0.15     LVIDd 04/05/2023 3.58     IVS 04/05/2023 1.12 (A)     Posterior Wall 04/05/2023 1.04     LVIDs 04/05/2023 2.14     FS 04/05/2023 40     LV mass 04/05/2023 119.77     LA size 04/05/2023 2.68     RVDD 04/05/2023 2.96     Right ventricular length* 04/05/2023 6.66     RA area 04/05/2023 11.8     Left Ventricle Relative * 04/05/2023 0.58     AV mean gradient 04/05/2023 4     AV valve area 04/05/2023 2.21     AV index (prosthetic) 04/05/2023 0.70     E/A ratio 04/05/2023 1.05     Mean e' 04/05/2023 0.13     E wave deceleration time 04/05/2023 217.00     LVOT diameter 04/05/2023 2.00     LVOT area 04/05/2023 3.1     LVOT peak VTI 04/05/2023 17.03     Ao peak shalom 04/05/2023 1.3     Ao VTI 04/05/2023 24.24     LVOT stroke volume 04/05/2023 53.47     AV  peak gradient 04/05/2023 7     E/E' ratio 04/05/2023 4.85     MV Peak E Brian 04/05/2023 0.63     TR Max Brian 04/05/2023 2.37     MV Peak A Brian 04/05/2023 0.60     LV Systolic Volume 04/05/2023 15.10     LV Systolic Volume Index 04/05/2023 7.1     LV Diastolic Volume 04/05/2023 53.70     LV Diastolic Volume Index 04/05/2023 25.09     LV Mass Index 04/05/2023 56     Triscuspid Valve Regurgi* 04/05/2023 22     RA Width 04/05/2023 3.23     Right Atrial Pressure (f* 04/05/2023 3     EF 04/05/2023 60     TV rest pulmonary artery* 04/05/2023 25        Last Imaging:  Echo  · The left ventricle is normal in size with normal systolic function.  · Normal right ventricular size with normal right ventricular systolic   function.  · Mild tricuspid regurgitation.  · The estimated ejection fraction is 60%.  · Normal left ventricular diastolic function.  · Normal central venous pressure (3 mmHg).  · The estimated PA systolic pressure is 25 mmHg.            **Labs and x-rays personally reviewed by me    ** reviewed      Objective:        Assessment:       1. Class 3 severe obesity with body mass index (BMI) of 40.0 to 44.9 in adult, unspecified obesity type, unspecified whether serious comorbidity present        2. Anxiety             Plan:         [unfilled]

## 2023-06-20 ENCOUNTER — PATIENT MESSAGE (OUTPATIENT)
Dept: FAMILY MEDICINE | Facility: CLINIC | Age: 28
End: 2023-06-20
Payer: COMMERCIAL

## 2023-06-20 ENCOUNTER — OFFICE VISIT (OUTPATIENT)
Dept: OBSTETRICS AND GYNECOLOGY | Facility: CLINIC | Age: 28
End: 2023-06-20
Payer: COMMERCIAL

## 2023-06-20 VITALS
OXYGEN SATURATION: 100 % | BODY MASS INDEX: 43.77 KG/M2 | SYSTOLIC BLOOD PRESSURE: 157 MMHG | WEIGHT: 247 LBS | HEIGHT: 63 IN | DIASTOLIC BLOOD PRESSURE: 84 MMHG | HEART RATE: 108 BPM

## 2023-06-20 DIAGNOSIS — Z30.011 ENCOUNTER FOR INITIAL PRESCRIPTION OF CONTRACEPTIVE PILLS: ICD-10-CM

## 2023-06-20 DIAGNOSIS — N92.6 MENSTRUAL BLEEDING PROBLEM: ICD-10-CM

## 2023-06-20 DIAGNOSIS — Z32.02 URINE PREGNANCY TEST NEGATIVE: ICD-10-CM

## 2023-06-20 DIAGNOSIS — Z86.19 HISTORY OF SEXUALLY TRANSMITTED DISEASE: ICD-10-CM

## 2023-06-20 DIAGNOSIS — Z20.2 ENCOUNTER FOR ASSESSMENT OF STD EXPOSURE: Primary | ICD-10-CM

## 2023-06-20 LAB
B-HCG UR QL: NEGATIVE
CANDIDA SPECIES: NEGATIVE
CTP QC/QA: YES
GARDNERELLA: POSITIVE
TRICHOMONAS: NEGATIVE

## 2023-06-20 PROCEDURE — 81025 POCT URINE PREGNANCY: ICD-10-PCS | Mod: QW,,, | Performed by: ADVANCED PRACTICE MIDWIFE

## 2023-06-20 PROCEDURE — 3008F PR BODY MASS INDEX (BMI) DOCUMENTED: ICD-10-PCS | Mod: ,,, | Performed by: ADVANCED PRACTICE MIDWIFE

## 2023-06-20 PROCEDURE — 3079F PR MOST RECENT DIASTOLIC BLOOD PRESSURE 80-89 MM HG: ICD-10-PCS | Mod: ,,, | Performed by: ADVANCED PRACTICE MIDWIFE

## 2023-06-20 PROCEDURE — 87660 TRICHOMONAS VAGIN DIR PROBE: CPT | Mod: ,,, | Performed by: CLINICAL MEDICAL LABORATORY

## 2023-06-20 PROCEDURE — 87591 N.GONORRHOEAE DNA AMP PROB: CPT | Mod: ,,, | Performed by: CLINICAL MEDICAL LABORATORY

## 2023-06-20 PROCEDURE — 87491 CHLAMYDIA/GONORRHOEAE(GC), PCR: ICD-10-PCS | Mod: ,,, | Performed by: CLINICAL MEDICAL LABORATORY

## 2023-06-20 PROCEDURE — 86780 TREPONEMA PALLIDUM (SYPHILIS) ANTIBODY: ICD-10-PCS | Mod: ,,, | Performed by: CLINICAL MEDICAL LABORATORY

## 2023-06-20 PROCEDURE — 87491 CHLMYD TRACH DNA AMP PROBE: CPT | Mod: ,,, | Performed by: CLINICAL MEDICAL LABORATORY

## 2023-06-20 PROCEDURE — 87510 GARDNER VAG DNA DIR PROBE: CPT | Mod: ,,, | Performed by: CLINICAL MEDICAL LABORATORY

## 2023-06-20 PROCEDURE — 81025 URINE PREGNANCY TEST: CPT | Mod: QW,,, | Performed by: ADVANCED PRACTICE MIDWIFE

## 2023-06-20 PROCEDURE — 87480 BACTERIAL VAGINOSIS: ICD-10-PCS | Mod: ,,, | Performed by: CLINICAL MEDICAL LABORATORY

## 2023-06-20 PROCEDURE — 87591 CHLAMYDIA/GONORRHOEAE(GC), PCR: ICD-10-PCS | Mod: ,,, | Performed by: CLINICAL MEDICAL LABORATORY

## 2023-06-20 PROCEDURE — 87389 HIV 1 / 2 ANTIBODY: ICD-10-PCS | Mod: ,,, | Performed by: CLINICAL MEDICAL LABORATORY

## 2023-06-20 PROCEDURE — 3077F PR MOST RECENT SYSTOLIC BLOOD PRESSURE >= 140 MM HG: ICD-10-PCS | Mod: ,,, | Performed by: ADVANCED PRACTICE MIDWIFE

## 2023-06-20 PROCEDURE — 99202 PR OFFICE/OUTPT VISIT, NEW, LEVL II, 15-29 MIN: ICD-10-PCS | Mod: ,,, | Performed by: ADVANCED PRACTICE MIDWIFE

## 2023-06-20 PROCEDURE — 87480 CANDIDA DNA DIR PROBE: CPT | Mod: ,,, | Performed by: CLINICAL MEDICAL LABORATORY

## 2023-06-20 PROCEDURE — 3079F DIAST BP 80-89 MM HG: CPT | Mod: ,,, | Performed by: ADVANCED PRACTICE MIDWIFE

## 2023-06-20 PROCEDURE — 99202 OFFICE O/P NEW SF 15 MIN: CPT | Mod: ,,, | Performed by: ADVANCED PRACTICE MIDWIFE

## 2023-06-20 PROCEDURE — 87510 BACTERIAL VAGINOSIS: ICD-10-PCS | Mod: ,,, | Performed by: CLINICAL MEDICAL LABORATORY

## 2023-06-20 PROCEDURE — 87660 BACTERIAL VAGINOSIS: ICD-10-PCS | Mod: ,,, | Performed by: CLINICAL MEDICAL LABORATORY

## 2023-06-20 PROCEDURE — 86780 TREPONEMA PALLIDUM: CPT | Mod: ,,, | Performed by: CLINICAL MEDICAL LABORATORY

## 2023-06-20 PROCEDURE — 3077F SYST BP >= 140 MM HG: CPT | Mod: ,,, | Performed by: ADVANCED PRACTICE MIDWIFE

## 2023-06-20 PROCEDURE — 87389 HIV-1 AG W/HIV-1&-2 AB AG IA: CPT | Mod: ,,, | Performed by: CLINICAL MEDICAL LABORATORY

## 2023-06-20 PROCEDURE — 3008F BODY MASS INDEX DOCD: CPT | Mod: ,,, | Performed by: ADVANCED PRACTICE MIDWIFE

## 2023-06-20 PROCEDURE — 1159F MED LIST DOCD IN RCRD: CPT | Mod: ,,, | Performed by: ADVANCED PRACTICE MIDWIFE

## 2023-06-20 PROCEDURE — 1159F PR MEDICATION LIST DOCUMENTED IN MEDICAL RECORD: ICD-10-PCS | Mod: ,,, | Performed by: ADVANCED PRACTICE MIDWIFE

## 2023-06-20 RX ORDER — NORETHINDRONE AND ETHINYL ESTRADIOL 0.4-0.035
1 KIT ORAL DAILY
Qty: 30 TABLET | Refills: 11 | Status: SHIPPED | OUTPATIENT
Start: 2023-06-20 | End: 2023-12-14

## 2023-06-20 NOTE — PROGRESS NOTES
"Patient ID:  Ashley Gonzalez is a 27 y.o. female      Chief Complaint:   Chief Complaint   Patient presents with    STD CHECK     Last pap: 21    Contraception        HPI:  Ashley is in requesting STD check including HIV and syphilis screens. Recently learned partner had been unfaithful. Reports past h/o chlamydia. Took Plan B 3 weeks ago. Has been on OCP Balziva in past, desires to restart for cycle regulation. Last Pap NILM 21 done by Dr. Aguilera in [place]. Has hx of anxiety dx after visit with cardiologist for chest pain and palpitations. Medical hx updated below.   LMP: Patient's last menstrual period was 2023. Monthly, lasting 7-10 days, heavy initially then normal flow  Sexually active:  not currently   Contraceptive: none      Past Medical History:   Diagnosis Date    CAMILA (generalized anxiety disorder)      History reviewed. No pertinent surgical history.    OB History          1    Para        Term                AB   1    Living             SAB   1    IAB        Ectopic        Multiple        Live Births                     BP (!) 157/84 (BP Location: Right arm, Patient Position: Sitting, BP Method: Large (Automatic))   Pulse 108   Ht 5' 3" (1.6 m)   Wt 112 kg (247 lb)   LMP 2023   SpO2 100%   BMI 43.75 kg/m²   Wt Readings from Last 3 Encounters:   23 112 kg (247 lb)   23 112.7 kg (248 lb 6.4 oz)   23 115.2 kg (254 lb)          ROS:  Review of Systems   Constitutional: Negative.    Eyes: Negative.    Respiratory: Negative.     Cardiovascular: Negative.    Gastrointestinal: Negative.    Genitourinary:  Positive for menstrual problem.   Musculoskeletal: Negative.    Neurological: Negative.    Psychiatric/Behavioral: Negative.          PHYSICAL EXAM:  Physical Exam  Constitutional:       Appearance: Normal appearance. She is obese.   Eyes:      Extraocular Movements: Extraocular movements intact.   Cardiovascular:      Rate and Rhythm: Normal rate. "      Pulses: Normal pulses.   Pulmonary:      Effort: Pulmonary effort is normal.   Musculoskeletal:         General: Normal range of motion.      Cervical back: Normal range of motion.   Skin:     General: Skin is warm and dry.   Neurological:      Mental Status: She is alert and oriented to person, place, and time.   Psychiatric:         Mood and Affect: Mood normal.         Behavior: Behavior normal.         Thought Content: Thought content normal.         Judgment: Judgment normal.        Assessment:  Ashley was seen today for std check and contraception.    Diagnoses and all orders for this visit:    Encounter for assessment of STD exposure  -     POCT urine pregnancy  -     Bacterial Vaginosis; Future  -     Chlamydia/GC, PCR; Future  -     HIV 1/2 Ag/Ab (4th Gen); Future  -     Treponema Pallidum (Syphillis) Antibody; Future  -     Bacterial Vaginosis  -     Chlamydia/GC, PCR  -     HIV 1/2 Ag/Ab (4th Gen)  -     Treponema Pallidum (Syphillis) Antibody    History of sexually transmitted disease  -     POCT urine pregnancy  -     Bacterial Vaginosis; Future  -     Chlamydia/GC, PCR; Future  -     HIV 1/2 Ag/Ab (4th Gen); Future  -     Treponema Pallidum (Syphillis) Antibody; Future  -     Bacterial Vaginosis  -     Chlamydia/GC, PCR  -     HIV 1/2 Ag/Ab (4th Gen)  -     Treponema Pallidum (Syphillis) Antibody    Encounter for initial prescription of contraceptive pills  -     norethindrone-ethinyl estradiol (BALZIVA, 28,) 0.4-35 mg-mcg per tablet; Take 1 tablet by mouth once daily.    Menstrual bleeding problem  -     norethindrone-ethinyl estradiol (BALZIVA, 28,) 0.4-35 mg-mcg per tablet; Take 1 tablet by mouth once daily.    BMI 40.0-44.9, adult    Urine pregnancy test negative          ICD-10-CM ICD-9-CM    1. Encounter for assessment of STD exposure  Z76.89 V72.85 POCT urine pregnancy      Bacterial Vaginosis      Chlamydia/GC, PCR      HIV 1/2 Ag/Ab (4th Gen)      Treponema Pallidum (Syphillis) Antibody       Bacterial Vaginosis      Chlamydia/GC, PCR      HIV 1/2 Ag/Ab (4th Gen)      Treponema Pallidum (Syphillis) Antibody      2. History of sexually transmitted disease  Z86.19 V12.09 POCT urine pregnancy      Bacterial Vaginosis      Chlamydia/GC, PCR      HIV 1/2 Ag/Ab (4th Gen)      Treponema Pallidum (Syphillis) Antibody      Bacterial Vaginosis      Chlamydia/GC, PCR      HIV 1/2 Ag/Ab (4th Gen)      Treponema Pallidum (Syphillis) Antibody      3. Encounter for initial prescription of contraceptive pills  Z30.011 V25.01 norethindrone-ethinyl estradiol (BALZIVA, 28,) 0.4-35 mg-mcg per tablet      4. Menstrual bleeding problem  N93.9 626.9 norethindrone-ethinyl estradiol (BALZIVA, 28,) 0.4-35 mg-mcg per tablet      5. BMI 40.0-44.9, adult  Z68.41 V85.41       6. Urine pregnancy test negative  Z32.02 V72.41           Plan:  UPT negative  Affirm swab self collected  Urine send for GC/CT testing  HIV/Syphilis ordered and collected  Will call or send My Chart message after results reviewed  Rx sent for Balziva OCP, instructed on use    Follow up in about 1 year (around 6/20/2024) for annual exam with Pap,  OCP surveillance.

## 2023-06-21 DIAGNOSIS — B96.89 BACTERIAL VAGINAL INFECTION: Primary | ICD-10-CM

## 2023-06-21 DIAGNOSIS — N76.0 BACTERIAL VAGINAL INFECTION: Primary | ICD-10-CM

## 2023-06-21 LAB
CHLAMYDIA BY PCR: NEGATIVE
HIV 1+O+2 AB SERPL QL: NORMAL
N. GONORRHOEAE (GC) BY PCR: NEGATIVE
SYPHILIS AB INTERPRETATION: NORMAL

## 2023-06-21 RX ORDER — METRONIDAZOLE 500 MG/1
500 TABLET ORAL 2 TIMES DAILY
Qty: 14 TABLET | Refills: 0 | Status: SHIPPED | OUTPATIENT
Start: 2023-06-21 | End: 2023-06-28

## 2023-07-31 ENCOUNTER — PATIENT MESSAGE (OUTPATIENT)
Dept: OBSTETRICS AND GYNECOLOGY | Facility: CLINIC | Age: 28
End: 2023-07-31
Payer: COMMERCIAL

## 2023-07-31 DIAGNOSIS — N92.1 BREAKTHROUGH BLEEDING ON BIRTH CONTROL PILLS: Primary | ICD-10-CM

## 2023-07-31 RX ORDER — IBUPROFEN 800 MG/1
800 TABLET ORAL EVERY 8 HOURS PRN
Qty: 60 TABLET | Refills: 2 | Status: SHIPPED | OUTPATIENT
Start: 2023-07-31 | End: 2023-09-13

## 2023-09-01 ENCOUNTER — PATIENT MESSAGE (OUTPATIENT)
Dept: FAMILY MEDICINE | Facility: CLINIC | Age: 28
End: 2023-09-01
Payer: COMMERCIAL

## 2023-09-05 RX ORDER — BUSPIRONE HYDROCHLORIDE 5 MG/1
5 TABLET ORAL 3 TIMES DAILY
Qty: 60 TABLET | Refills: 1 | Status: SHIPPED | OUTPATIENT
Start: 2023-09-05 | End: 2023-10-05 | Stop reason: SDUPTHER

## 2023-09-05 RX ORDER — PROPRANOLOL HYDROCHLORIDE 10 MG/1
10 TABLET ORAL 3 TIMES DAILY PRN
Qty: 90 TABLET | Refills: 0 | Status: SHIPPED | OUTPATIENT
Start: 2023-09-05 | End: 2023-10-05 | Stop reason: SDUPTHER

## 2023-09-12 ENCOUNTER — OFFICE VISIT (OUTPATIENT)
Dept: FAMILY MEDICINE | Facility: CLINIC | Age: 28
End: 2023-09-12
Payer: COMMERCIAL

## 2023-09-12 VITALS
HEIGHT: 63 IN | SYSTOLIC BLOOD PRESSURE: 114 MMHG | WEIGHT: 243.81 LBS | DIASTOLIC BLOOD PRESSURE: 84 MMHG | BODY MASS INDEX: 43.2 KG/M2 | TEMPERATURE: 99 F | HEART RATE: 84 BPM | OXYGEN SATURATION: 97 %

## 2023-09-12 DIAGNOSIS — J06.9 UPPER RESPIRATORY TRACT INFECTION, UNSPECIFIED TYPE: ICD-10-CM

## 2023-09-12 DIAGNOSIS — R50.9 FEVER, UNSPECIFIED FEVER CAUSE: ICD-10-CM

## 2023-09-12 DIAGNOSIS — Z20.822 COVID-19 RULED OUT BY LABORATORY TESTING: Primary | ICD-10-CM

## 2023-09-12 LAB
CTP QC/QA: YES
CTP QC/QA: YES
FLUAV AG NPH QL: NEGATIVE
FLUBV AG NPH QL: NEGATIVE
S PYO RRNA THROAT QL PROBE: NEGATIVE
SARS-COV-2 AG RESP QL IA.RAPID: NEGATIVE

## 2023-09-12 PROCEDURE — 1159F MED LIST DOCD IN RCRD: CPT | Mod: ,,, | Performed by: NURSE PRACTITIONER

## 2023-09-12 PROCEDURE — 3008F BODY MASS INDEX DOCD: CPT | Mod: ,,, | Performed by: NURSE PRACTITIONER

## 2023-09-12 PROCEDURE — 1159F PR MEDICATION LIST DOCUMENTED IN MEDICAL RECORD: ICD-10-PCS | Mod: ,,, | Performed by: NURSE PRACTITIONER

## 2023-09-12 PROCEDURE — 87428 POCT SARS-COV2 (COVID) WITH FLU ANTIGEN: ICD-10-PCS | Mod: QW,,, | Performed by: NURSE PRACTITIONER

## 2023-09-12 PROCEDURE — 3074F SYST BP LT 130 MM HG: CPT | Mod: ,,, | Performed by: NURSE PRACTITIONER

## 2023-09-12 PROCEDURE — 99213 PR OFFICE/OUTPT VISIT, EST, LEVL III, 20-29 MIN: ICD-10-PCS | Mod: ,,, | Performed by: NURSE PRACTITIONER

## 2023-09-12 PROCEDURE — 3079F PR MOST RECENT DIASTOLIC BLOOD PRESSURE 80-89 MM HG: ICD-10-PCS | Mod: ,,, | Performed by: NURSE PRACTITIONER

## 2023-09-12 PROCEDURE — 87880 POCT RAPID STREP A: ICD-10-PCS | Mod: QW,,, | Performed by: NURSE PRACTITIONER

## 2023-09-12 PROCEDURE — 1160F RVW MEDS BY RX/DR IN RCRD: CPT | Mod: ,,, | Performed by: NURSE PRACTITIONER

## 2023-09-12 PROCEDURE — 87428 SARSCOV & INF VIR A&B AG IA: CPT | Mod: QW,,, | Performed by: NURSE PRACTITIONER

## 2023-09-12 PROCEDURE — 3008F PR BODY MASS INDEX (BMI) DOCUMENTED: ICD-10-PCS | Mod: ,,, | Performed by: NURSE PRACTITIONER

## 2023-09-12 PROCEDURE — 87880 STREP A ASSAY W/OPTIC: CPT | Mod: QW,,, | Performed by: NURSE PRACTITIONER

## 2023-09-12 PROCEDURE — 99213 OFFICE O/P EST LOW 20 MIN: CPT | Mod: ,,, | Performed by: NURSE PRACTITIONER

## 2023-09-12 PROCEDURE — 1160F PR REVIEW ALL MEDS BY PRESCRIBER/CLIN PHARMACIST DOCUMENTED: ICD-10-PCS | Mod: ,,, | Performed by: NURSE PRACTITIONER

## 2023-09-12 PROCEDURE — 3074F PR MOST RECENT SYSTOLIC BLOOD PRESSURE < 130 MM HG: ICD-10-PCS | Mod: ,,, | Performed by: NURSE PRACTITIONER

## 2023-09-12 PROCEDURE — 3079F DIAST BP 80-89 MM HG: CPT | Mod: ,,, | Performed by: NURSE PRACTITIONER

## 2023-09-12 RX ORDER — PREDNISONE 5 MG/1
5 TABLET ORAL DAILY
Qty: 5 TABLET | Refills: 0 | Status: SHIPPED | OUTPATIENT
Start: 2023-09-12

## 2023-09-12 RX ORDER — AZITHROMYCIN 250 MG/1
TABLET, FILM COATED ORAL
Qty: 6 TABLET | Refills: 0 | Status: SHIPPED | OUTPATIENT
Start: 2023-09-12 | End: 2023-09-17

## 2023-09-12 RX ORDER — BENZONATATE 100 MG/1
100 CAPSULE ORAL 3 TIMES DAILY PRN
Qty: 21 CAPSULE | Refills: 0 | Status: SHIPPED | OUTPATIENT
Start: 2023-09-12 | End: 2023-09-22

## 2023-09-12 NOTE — PATIENT INSTRUCTIONS
1.Push Fluids  2.Alternate Tylenol and Ibuprofen for body aches and fever.  3.Rest activity as tolerated         Return to clinic if symptoms persist or worsen.

## 2023-09-12 NOTE — LETTER
September 12, 2023      [facility]  [address] [place] [address] [place] [address]  Phone: [phone]  Fax: [phone]       Patient: Ashley Gonzalez   YOB: 1995  Date of Visit: 09/12/2023    To Whom It May Concern:    Tish Gonzalez  was at [facility] on 09/12/2023. She may return to work/school on 9/14/2023 with no restrictions. If you have any questions or concerns, or if I can be of further assistance, please do not hesitate to contact me.    Sincerely,    Lisa Pedroza LPN       
09-Aug-2022 00:24

## 2023-09-13 PROBLEM — Z20.822 COVID-19 RULED OUT BY LABORATORY TESTING: Status: ACTIVE | Noted: 2023-09-13

## 2023-09-13 PROBLEM — J06.9 UPPER RESPIRATORY TRACT INFECTION: Status: ACTIVE | Noted: 2023-09-13

## 2023-09-13 PROBLEM — R50.9 FEVER: Status: ACTIVE | Noted: 2023-09-13

## 2023-09-13 NOTE — PROGRESS NOTES
"[facility]  Chief Complaint      Chief Complaint   Patient presents with    Sore Throat     Patient reports to clinic C/O sore throat that started last night.     Fatigue    Fever     Patient temp today is 98.5 and patient states for her that is a fever.     Cough     Cough is non-productive. States it is a dry hacking feel.     Nasal Congestion     States she has taken a Zyrtec and Claritin last night but it did not help.     Chest Congestion    Joint Pain     C/O of joint pain. Rates a 8 on a 0-10 scale. States she has taken Tylenol and ibuprofen but has not helped. States " I feel like I got hit by a truck honestly."     Shortness of Breath     Respirations     Nausea     States the drainage from her sinuses is causing her nausea     Health Maintenance     Care Gap not discussed patient is ill. She is not fasting. Did not bring medications today but has taken medication for the day.       History of Present Illness      Ashley Gonzalez is a 28 y.o. female  who presents today for evaluation of sore throat, fatigue, fever, nonproductive cough joint pain and nausea. Onset of symptoms began 24 hours ago and have progressed. She has been taking tylenol and ibuprofen, clartin without improvement. Covid-19, flu, and strep throat negative in clinic today.    HPI     Past Medical History:  Past Medical History:   Diagnosis Date    CAMILA (generalized anxiety disorder)        Past Surgical History:   has no past surgical history on file.    Social History:  Social History     Tobacco Use    Smoking status: Every Day     Types: Vaping with nicotine    Smokeless tobacco: Never   Substance Use Topics    Alcohol use: Never    Drug use: Never       I personally reviewed all past medical, surgical, and social.     Review of Systems   Constitutional:  Positive for fatigue and fever. Negative for appetite change and unexpected weight change.   HENT:  Positive for congestion and sore throat.    Respiratory:  " Positive for cough and shortness of breath.    Cardiovascular:  Negative for chest pain and leg swelling.   Gastrointestinal:  Positive for nausea. Negative for abdominal pain, constipation and diarrhea.   Genitourinary:  Negative for difficulty urinating and dysuria.   Musculoskeletal:  Positive for myalgias. Negative for arthralgias.        Joint pain   Skin:  Negative for color change and rash.   Neurological:  Negative for dizziness, syncope, weakness and headaches.      Medications:  Outpatient Encounter Medications as of 9/12/2023   Medication Sig Dispense Refill    busPIRone (BUSPAR) 5 MG Tab Take 1 tablet (5 mg total) by mouth 3 (three) times daily. 60 tablet 1    hydrOXYzine HCL (ATARAX) 25 MG tablet Take 1 tablet (25 mg total) by mouth 3 (three) times daily as needed for Itching or Anxiety. 30 tablet 2    norethindrone-ethinyl estradiol (BALZIVA, 28,) 0.4-35 mg-mcg per tablet Take 1 tablet by mouth once daily. 30 tablet 11    propranoloL (INDERAL) 10 MG tablet Take 1 tablet (10 mg total) by mouth 3 (three) times daily as needed (anxiety). 90 tablet 0    amitriptyline (ELAVIL) 10 MG tablet Take 10 mg by mouth every evening.      azithromycin (Z-BLAKE) 250 MG tablet Take 2 tablets by mouth on day 1; Take 1 tablet by mouth on days 2-5 6 tablet 0    benzonatate (TESSALON) 100 MG capsule Take 1 capsule (100 mg total) by mouth 3 (three) times daily as needed for Cough. 21 capsule 0    ibuprofen (ADVIL,MOTRIN) 800 MG tablet Take 1 tablet (800 mg total) by mouth every 8 (eight) hours as needed for Pain. Take 1 tablet every 8 hours during menstrual cycle for up to 7 days. (Patient not taking: Reported on 9/12/2023) 60 tablet 2    predniSONE (DELTASONE) 5 MG tablet Take 1 tablet (5 mg total) by mouth once daily. 5 tablet 0     No facility-administered encounter medications on file as of 9/12/2023.       Allergies:  Review of patient's allergies indicates:   Allergen Reactions    Red (food color) Hives and Itching     "Yellow dye Hives and Itching    Quetiapine Anxiety, Diarrhea, Nausea And Vomiting, Other (See Comments), Photosensitivity and Rash     Other reaction(s): Headache       Health Maintenance:  Immunization History   Administered Date(s) Administered    COVID-19 Vaccine 06/04/2021, 06/25/2021    COVID-19, mRNA, LNP-S, bivalent booster, PF (PFIZER OMICRON) 04/30/2022    Tdap 01/17/2019        Health Maintenance   Topic Date Due    Hepatitis C Screening  Never done    Pap Smear  01/11/2024    TETANUS VACCINE  01/17/2029    Lipid Panel  Completed        Physical Exam      Vital Signs  Temp: 98.5 °F (36.9 °C)  Pulse: 84  SpO2: 97 %  BP: 114/84  BP Location: Left arm  Patient Position: Sitting  Pain Score:   8  Pain Loc:  (Joint Pain)  Height and Weight  Height: 5' 3" (160 cm)  Weight: 110.6 kg (243 lb 12.8 oz)  BSA (Calculated - sq m): 2.22 sq meters  BMI (Calculated): 43.2  Weight in (lb) to have BMI = 25: 140.8]    Physical Exam  Constitutional:       General: She is not in acute distress.     Appearance: Normal appearance. She is obese.   HENT:      Head: Normocephalic.      Right Ear: Tympanic membrane normal.      Left Ear: Tympanic membrane normal.      Nose:      Right Sinus: No maxillary sinus tenderness or frontal sinus tenderness.      Left Sinus: No maxillary sinus tenderness or frontal sinus tenderness.      Mouth/Throat:      Mouth: Mucous membranes are moist.      Pharynx: Posterior oropharyngeal erythema present. No oropharyngeal exudate.      Tonsils: No tonsillar exudate.   Cardiovascular:      Rate and Rhythm: Normal rate and regular rhythm.      Pulses: Normal pulses.      Heart sounds: Normal heart sounds. No murmur heard.  Pulmonary:      Effort: No respiratory distress.      Breath sounds: Normal breath sounds. No wheezing, rhonchi or rales.   Abdominal:      Palpations: Abdomen is soft.      Tenderness: There is no abdominal tenderness.   Musculoskeletal:         General: Normal range of motion.      " "Cervical back: Neck supple.   Skin:     General: Skin is warm and dry.   Neurological:      Mental Status: She is alert and oriented to person, place, and time.   Psychiatric:         Mood and Affect: Mood normal.         Behavior: Behavior normal.        Laboratory:  CBC:  Recent Labs   Lab 02/02/22  0833 05/07/22  0323 01/03/23  1216   WBC 10.6 14.5 H 10.40   RBC 4.37 4.34 4.36   Hemoglobin 13.4 13.2 13.1   Hematocrit  --   --  39.0   Hct 41.6 39.8  --    Platelets 372 339  --    Platelet Count  --   --  373   MCV 95.2 91.7 89.4   MCH 30.7 30.4 30.0   MCHC 32.2 33.2 33.6       LIPIDS:  Recent Labs   Lab 01/03/23  1216   TSH 1.680       TSH:  Recent Labs   Lab 01/03/23  1216   TSH 1.680       A1C:        Lab Results   Component Value Date    GLU 88 01/03/2023     01/03/2023     (L) 05/07/2022    K 3.9 01/03/2023    K 3.3 (L) 05/07/2022     01/03/2023     05/07/2022    CO2 31 01/03/2023    CO2 28 05/07/2022    BUN 8 01/03/2023    BUN 6 (L) 05/07/2022    CREATININE 0.55 01/03/2023    CREATININE 0.81 05/07/2022    CALCIUM 9.3 01/03/2023    CALCIUM 8.9 05/07/2022    PROT 7.6 01/03/2023    ALBUMIN 3.7 01/03/2023    ALBUMIN 3.6 05/07/2022    BILITOT 0.2 01/03/2023    ALKPHOS 73 01/03/2023    ALKPHOS 108 05/07/2022    AST 32 01/03/2023    AST 51 (H) 05/07/2022    ALT 43 01/03/2023    ALT 84 (H) 05/07/2022    ANIONGAP 9 01/03/2023    ESTGFRAFRICA >60 05/07/2022    EGFRNONAA >60 05/07/2022       Lab Results   Component Value Date    WBC 10.40 01/03/2023    WBC 14.5 (H) 05/07/2022    RBC 4.36 01/03/2023    RBC 4.34 05/07/2022    HGB 13.1 01/03/2023    HGB 13.2 05/07/2022    HCT 39.0 01/03/2023    HCT 39.8 05/07/2022    MCV 89.4 01/03/2023    MCV 91.7 05/07/2022    RDW 12.7 01/03/2023    RDW 12.3 05/07/2022     01/03/2023     05/07/2022        No results found for: "CHOL", "TRIG", "HDL", "LDLCALC", "TOTALCHOLEST"    Lab Results   Component Value Date    TSH 1.680 01/03/2023       No " "results found for: "HGBA1C", "ESTIMATEDAVG"     No components found for: "VITAMIND"    No results found for: "PSA"    Point Of Care Testing:  Nitrites, UA   Date Value Ref Range Status   01/03/2023 Negative Negative Final     Urobilinogen, UA   Date Value Ref Range Status   01/03/2023 Normal 0.2, 1.0, Normal mg/dL Final     pH, UA   Date Value Ref Range Status   01/03/2023 7.0 5.0 to 8.0 pH Units Final     Specific Gravity, UA   Date Value Ref Range Status   01/03/2023 1.004 <=1.030 Final     Ketones, UA   Date Value Ref Range Status   01/03/2023 Negative Negative mg/dL Final       Lab Results   Component Value Date    [id number] Negative 09/12/2023    RAPFLUA Negative 09/12/2023    RAPFLUB Negative 09/12/2023         Assessment/Plan     1. COVID-19 ruled out by laboratory testing  -     POCT SARS-COV2 (COVID) with Flu Antigen  -     POCT rapid strep A    2. Fever, unspecified fever cause  -     POCT SARS-COV2 (COVID) with Flu Antigen  -     POCT rapid strep A    3. Upper respiratory tract infection, unspecified type  -     azithromycin (Z-BLAKE) 250 MG tablet; Take 2 tablets by mouth on day 1; Take 1 tablet by mouth on days 2-5  Dispense: 6 tablet; Refill: 0  -     benzonatate (TESSALON) 100 MG capsule; Take 1 capsule (100 mg total) by mouth 3 (three) times daily as needed for Cough.  Dispense: 21 capsule; Refill: 0  -     predniSONE (DELTASONE) 5 MG tablet; Take 1 tablet (5 mg total) by mouth once daily.  Dispense: 5 tablet; Refill: 0           Return to clinic if symptoms worsen or fail to improve.    Questions answered to desired level of satisfaction    Verbalized understanding to all information and instructions provided      AURY [facility]    "

## 2023-10-05 ENCOUNTER — OFFICE VISIT (OUTPATIENT)
Dept: FAMILY MEDICINE | Facility: CLINIC | Age: 28
End: 2023-10-05
Payer: COMMERCIAL

## 2023-10-05 VITALS
SYSTOLIC BLOOD PRESSURE: 119 MMHG | DIASTOLIC BLOOD PRESSURE: 83 MMHG | RESPIRATION RATE: 18 BRPM | BODY MASS INDEX: 42.52 KG/M2 | TEMPERATURE: 98 F | OXYGEN SATURATION: 98 % | HEIGHT: 63 IN | WEIGHT: 240 LBS | HEART RATE: 90 BPM

## 2023-10-05 DIAGNOSIS — E66.9 OBESITY, UNSPECIFIED CLASSIFICATION, UNSPECIFIED OBESITY TYPE, UNSPECIFIED WHETHER SERIOUS COMORBIDITY PRESENT: ICD-10-CM

## 2023-10-05 DIAGNOSIS — E05.90 HYPERTHYROIDISM: Primary | ICD-10-CM

## 2023-10-05 LAB — TSH SERPL DL<=0.005 MIU/L-ACNC: 0.71 UIU/ML (ref 0.36–3.74)

## 2023-10-05 PROCEDURE — 3079F DIAST BP 80-89 MM HG: CPT | Mod: ,,, | Performed by: INTERNAL MEDICINE

## 2023-10-05 PROCEDURE — 3074F SYST BP LT 130 MM HG: CPT | Mod: ,,, | Performed by: INTERNAL MEDICINE

## 2023-10-05 PROCEDURE — 84443 ASSAY THYROID STIM HORMONE: CPT | Mod: ,,, | Performed by: CLINICAL MEDICAL LABORATORY

## 2023-10-05 PROCEDURE — 3008F PR BODY MASS INDEX (BMI) DOCUMENTED: ICD-10-PCS | Mod: ,,, | Performed by: INTERNAL MEDICINE

## 2023-10-05 PROCEDURE — 99214 PR OFFICE/OUTPT VISIT, EST, LEVL IV, 30-39 MIN: ICD-10-PCS | Mod: ,,, | Performed by: INTERNAL MEDICINE

## 2023-10-05 PROCEDURE — 3074F PR MOST RECENT SYSTOLIC BLOOD PRESSURE < 130 MM HG: ICD-10-PCS | Mod: ,,, | Performed by: INTERNAL MEDICINE

## 2023-10-05 PROCEDURE — 3008F BODY MASS INDEX DOCD: CPT | Mod: ,,, | Performed by: INTERNAL MEDICINE

## 2023-10-05 PROCEDURE — 1159F MED LIST DOCD IN RCRD: CPT | Mod: ,,, | Performed by: INTERNAL MEDICINE

## 2023-10-05 PROCEDURE — 1160F PR REVIEW ALL MEDS BY PRESCRIBER/CLIN PHARMACIST DOCUMENTED: ICD-10-PCS | Mod: ,,, | Performed by: INTERNAL MEDICINE

## 2023-10-05 PROCEDURE — 3079F PR MOST RECENT DIASTOLIC BLOOD PRESSURE 80-89 MM HG: ICD-10-PCS | Mod: ,,, | Performed by: INTERNAL MEDICINE

## 2023-10-05 PROCEDURE — 99214 OFFICE O/P EST MOD 30 MIN: CPT | Mod: ,,, | Performed by: INTERNAL MEDICINE

## 2023-10-05 PROCEDURE — 1160F RVW MEDS BY RX/DR IN RCRD: CPT | Mod: ,,, | Performed by: INTERNAL MEDICINE

## 2023-10-05 PROCEDURE — 1159F PR MEDICATION LIST DOCUMENTED IN MEDICAL RECORD: ICD-10-PCS | Mod: ,,, | Performed by: INTERNAL MEDICINE

## 2023-10-05 PROCEDURE — 84443 TSH: ICD-10-PCS | Mod: ,,, | Performed by: CLINICAL MEDICAL LABORATORY

## 2023-10-05 RX ORDER — BUSPIRONE HYDROCHLORIDE 5 MG/1
5 TABLET ORAL 3 TIMES DAILY
Qty: 60 TABLET | Refills: 1 | Status: SHIPPED | OUTPATIENT
Start: 2023-10-05 | End: 2023-10-09 | Stop reason: SDUPTHER

## 2023-10-05 RX ORDER — PROPRANOLOL HYDROCHLORIDE 10 MG/1
10 TABLET ORAL 3 TIMES DAILY PRN
Qty: 90 TABLET | Refills: 0 | Status: SHIPPED | OUTPATIENT
Start: 2023-10-05 | End: 2023-10-09 | Stop reason: SDUPTHER

## 2023-10-05 RX ORDER — PHENTERMINE HYDROCHLORIDE 37.5 MG/1
37.5 TABLET ORAL DAILY
Qty: 30 TABLET | Refills: 0 | Status: SHIPPED | OUTPATIENT
Start: 2023-10-05

## 2023-10-06 ENCOUNTER — PATIENT MESSAGE (OUTPATIENT)
Dept: FAMILY MEDICINE | Facility: CLINIC | Age: 28
End: 2023-10-06
Payer: COMMERCIAL

## 2023-10-06 ENCOUNTER — PATIENT OUTREACH (OUTPATIENT)
Dept: ADMINISTRATIVE | Facility: HOSPITAL | Age: 28
End: 2023-10-06

## 2023-10-06 NOTE — PROGRESS NOTES
Population Health review of encounter with date of service 10/5/23.  Does not qualify for HY or [facility].  Has HY scheduled for 11/6/23.Pallavi

## 2023-10-09 RX ORDER — PROPRANOLOL HYDROCHLORIDE 10 MG/1
10 TABLET ORAL 3 TIMES DAILY PRN
Qty: 90 TABLET | Refills: 0 | Status: SHIPPED | OUTPATIENT
Start: 2023-10-09 | End: 2023-11-13 | Stop reason: SDUPTHER

## 2023-10-09 RX ORDER — BUSPIRONE HYDROCHLORIDE 5 MG/1
5 TABLET ORAL 3 TIMES DAILY
Qty: 90 TABLET | Refills: 2 | Status: SHIPPED | OUTPATIENT
Start: 2023-10-09 | End: 2023-11-13 | Stop reason: SDUPTHER

## 2023-10-11 PROBLEM — E05.90 HYPERTHYROIDISM: Status: ACTIVE | Noted: 2023-10-11

## 2023-10-11 PROBLEM — E66.9 OBESITY: Status: ACTIVE | Noted: 2023-06-16

## 2023-10-11 NOTE — PROGRESS NOTES
Subjective:       Patient ID: Ashley Gonzalez is a 28 y.o. female.    Chief Complaint: Back Pain  Patient seen and evaluated patient has chronic obesity chronic hypothyroidism refer the patient to Dr. Pool a BMI is 42.5.  Will recheck a TSH today.  Also resume Adipex 37.5 mg 1 p.o. q.day. please see the chart for additional details  Back Pain  Associated symptoms include headaches. Pertinent negatives include no chest pain, dysuria or weakness.     .    Current Medications:    Current Outpatient Medications:     hydrOXYzine HCL (ATARAX) 25 MG tablet, Take 1 tablet (25 mg total) by mouth 3 (three) times daily as needed for Itching or Anxiety., Disp: 30 tablet, Rfl: 2    norethindrone-ethinyl estradiol (BALZIVA, 28,) 0.4-35 mg-mcg per tablet, Take 1 tablet by mouth once daily., Disp: 30 tablet, Rfl: 11    busPIRone (BUSPAR) 5 MG Tab, Take 1 tablet (5 mg total) by mouth 3 (three) times daily., Disp: 90 tablet, Rfl: 2    phentermine (ADIPEX-P) 37.5 mg tablet, Take 1 tablet (37.5 mg total) by mouth once daily., Disp: 30 tablet, Rfl: 0    predniSONE (DELTASONE) 5 MG tablet, Take 1 tablet (5 mg total) by mouth once daily. (Patient not taking: Reported on 10/5/2023), Disp: 5 tablet, Rfl: 0    propranoloL (INDERAL) 10 MG tablet, Take 1 tablet (10 mg total) by mouth 3 (three) times daily as needed (anxiety)., Disp: 90 tablet, Rfl: 0           Review of Systems   Constitutional:  Negative for activity change and unexpected weight change.   HENT:  Negative for hearing loss, rhinorrhea and trouble swallowing.    Eyes:  Negative for discharge and visual disturbance.   Respiratory:  Negative for chest tightness and wheezing.    Cardiovascular:  Negative for chest pain and palpitations.   Gastrointestinal:  Negative for blood in stool, constipation, diarrhea and vomiting.   Endocrine: Negative for polydipsia and polyuria.   Genitourinary:  Negative for difficulty urinating, dysuria, hematuria and menstrual problem.  "  Musculoskeletal:  Positive for arthralgias, back pain and joint swelling. Negative for neck pain.   Neurological:  Positive for headaches. Negative for weakness.   Psychiatric/Behavioral:  Negative for confusion and dysphoric mood.                 Vitals:    10/05/23 1536   BP: 119/83   BP Location: Right arm   Patient Position: Sitting   BP Method: Large (Automatic)   Pulse: 90   Resp: 18   Temp: 97.7 °F (36.5 °C)   TempSrc: Temporal   SpO2: 98%   Weight: 108.9 kg (240 lb)   Height: 5' 3" (1.6 m)        Physical Exam  Vitals and nursing note reviewed.   Constitutional:       Appearance: Normal appearance.   Cardiovascular:      Rate and Rhythm: Normal rate and regular rhythm.      Pulses: Normal pulses.      Heart sounds: Normal heart sounds.   Pulmonary:      Effort: Pulmonary effort is normal.      Breath sounds: Normal breath sounds.   Abdominal:      General: Abdomen is flat. Bowel sounds are normal.      Palpations: Abdomen is soft.   Musculoskeletal:         General: Normal range of motion.   Skin:     General: Skin is warm and dry.   Neurological:      General: No focal deficit present.      Mental Status: She is alert and oriented to person, place, and time. Mental status is at baseline.           Last Labs:     Office Visit on 10/05/2023   Component Date Value    TSH 10/05/2023 0.710    Office Visit on 09/12/2023   Component Date Value    SARS Coronavirus 2 Antig* 09/12/2023 Negative     Rapid Influenza A Ag 09/12/2023 Negative     Rapid Influenza B Ag 09/12/2023 Negative      Acceptab* 09/12/2023 Yes     Rapid Strep A Screen 09/12/2023 Negative      Acceptab* 09/12/2023 Yes        Last Imaging:  Echo  · The left ventricle is normal in size with normal systolic function.  · Normal right ventricular size with normal right ventricular systolic   function.  · Mild tricuspid regurgitation.  · The estimated ejection fraction is 60%.  · Normal left ventricular diastolic " function.  · Normal central venous pressure (3 mmHg).  · The estimated PA systolic pressure is 25 mmHg.            **Labs and x-rays personally reviewed by me    ** reviewed      Objective:        Assessment:       1. Hyperthyroidism  TSH    TSH      2. Obesity, unspecified classification, unspecified obesity type, unspecified whether serious comorbidity present  Ambulatory referral/consult to Weight Management Program    CANCELED: Ambulatory referral/consult to Weight Management Program           Plan:         [unfilled]

## 2023-11-13 ENCOUNTER — OFFICE VISIT (OUTPATIENT)
Dept: FAMILY MEDICINE | Facility: CLINIC | Age: 28
End: 2023-11-13
Payer: COMMERCIAL

## 2023-11-13 VITALS
HEART RATE: 74 BPM | DIASTOLIC BLOOD PRESSURE: 105 MMHG | HEIGHT: 63 IN | TEMPERATURE: 98 F | SYSTOLIC BLOOD PRESSURE: 141 MMHG | OXYGEN SATURATION: 99 % | RESPIRATION RATE: 18 BRPM | WEIGHT: 242 LBS | BODY MASS INDEX: 42.88 KG/M2

## 2023-11-13 DIAGNOSIS — I10 ESSENTIAL (PRIMARY) HYPERTENSION: Primary | ICD-10-CM

## 2023-11-13 DIAGNOSIS — F41.9 ANXIETY: ICD-10-CM

## 2023-11-13 PROCEDURE — 3008F PR BODY MASS INDEX (BMI) DOCUMENTED: ICD-10-PCS | Mod: ,,, | Performed by: INTERNAL MEDICINE

## 2023-11-13 PROCEDURE — 99214 PR OFFICE/OUTPT VISIT, EST, LEVL IV, 30-39 MIN: ICD-10-PCS | Mod: ,,, | Performed by: INTERNAL MEDICINE

## 2023-11-13 PROCEDURE — 1159F MED LIST DOCD IN RCRD: CPT | Mod: ,,, | Performed by: INTERNAL MEDICINE

## 2023-11-13 PROCEDURE — 3077F SYST BP >= 140 MM HG: CPT | Mod: ,,, | Performed by: INTERNAL MEDICINE

## 2023-11-13 PROCEDURE — 1159F PR MEDICATION LIST DOCUMENTED IN MEDICAL RECORD: ICD-10-PCS | Mod: ,,, | Performed by: INTERNAL MEDICINE

## 2023-11-13 PROCEDURE — 3080F PR MOST RECENT DIASTOLIC BLOOD PRESSURE >= 90 MM HG: ICD-10-PCS | Mod: ,,, | Performed by: INTERNAL MEDICINE

## 2023-11-13 PROCEDURE — 3080F DIAST BP >= 90 MM HG: CPT | Mod: ,,, | Performed by: INTERNAL MEDICINE

## 2023-11-13 PROCEDURE — 1160F PR REVIEW ALL MEDS BY PRESCRIBER/CLIN PHARMACIST DOCUMENTED: ICD-10-PCS | Mod: ,,, | Performed by: INTERNAL MEDICINE

## 2023-11-13 PROCEDURE — 3077F PR MOST RECENT SYSTOLIC BLOOD PRESSURE >= 140 MM HG: ICD-10-PCS | Mod: ,,, | Performed by: INTERNAL MEDICINE

## 2023-11-13 PROCEDURE — 3008F BODY MASS INDEX DOCD: CPT | Mod: ,,, | Performed by: INTERNAL MEDICINE

## 2023-11-13 PROCEDURE — 1160F RVW MEDS BY RX/DR IN RCRD: CPT | Mod: ,,, | Performed by: INTERNAL MEDICINE

## 2023-11-13 PROCEDURE — 99214 OFFICE O/P EST MOD 30 MIN: CPT | Mod: ,,, | Performed by: INTERNAL MEDICINE

## 2023-11-13 RX ORDER — PROPRANOLOL HYDROCHLORIDE 10 MG/1
10 TABLET ORAL 3 TIMES DAILY PRN
Qty: 90 TABLET | Refills: 0 | Status: SHIPPED | OUTPATIENT
Start: 2023-11-13 | End: 2024-01-22 | Stop reason: SDUPTHER

## 2023-11-13 RX ORDER — BUSPIRONE HYDROCHLORIDE 10 MG/1
10 TABLET ORAL 3 TIMES DAILY
Qty: 180 TABLET | Refills: 1 | Status: SHIPPED | OUTPATIENT
Start: 2023-11-13

## 2023-11-21 PROBLEM — I10 ESSENTIAL (PRIMARY) HYPERTENSION: Status: ACTIVE | Noted: 2023-11-21

## 2023-11-21 NOTE — PROGRESS NOTES
Subjective:       Patient ID: Ashley Gonzalez is a 28 y.o. female.    Chief Complaint: Hyperthyroidism    HPI  .  Patient presents with chronic hypertension chronic obesity chronic anxiety.  Blood pressure is not at goal.  Patient stated the blood pressure at home has been stable.  Today I am going to refill the Inderal 10 mg 1 p.o. 3 times a day.    For the chronic anxiety increase BuSpar to 10 mg 1 p.o. 3 times a day patient stated that she is been having flare-ups of anxiety  For obesity recommend diet exercise and lifestyle modification.  Current Medications:    Current Outpatient Medications:     hydrOXYzine HCL (ATARAX) 25 MG tablet, Take 1 tablet (25 mg total) by mouth 3 (three) times daily as needed for Itching or Anxiety., Disp: 30 tablet, Rfl: 2    norethindrone-ethinyl estradiol (BALZIVA, 28,) 0.4-35 mg-mcg per tablet, Take 1 tablet by mouth once daily., Disp: 30 tablet, Rfl: 11    phentermine (ADIPEX-P) 37.5 mg tablet, Take 1 tablet (37.5 mg total) by mouth once daily., Disp: 30 tablet, Rfl: 0    busPIRone (BUSPAR) 10 MG tablet, Take 1 tablet (10 mg total) by mouth 3 (three) times daily., Disp: 180 tablet, Rfl: 1    predniSONE (DELTASONE) 5 MG tablet, Take 1 tablet (5 mg total) by mouth once daily. (Patient not taking: Reported on 10/5/2023), Disp: 5 tablet, Rfl: 0    propranoloL (INDERAL) 10 MG tablet, Take 1 tablet (10 mg total) by mouth 3 (three) times daily as needed (anxiety)., Disp: 90 tablet, Rfl: 0           Review of Systems   Constitutional:  Negative for appetite change, fatigue and fever.   Respiratory:  Negative for shortness of breath.    Cardiovascular:  Negative for chest pain.   Gastrointestinal:  Negative for abdominal pain and constipation.   Endocrine: Negative for polydipsia, polyphagia and polyuria.   Genitourinary:  Negative for difficulty urinating, frequency and hot flashes.   Allergic/Immunologic: Negative for environmental allergies.   Neurological:  Negative for dizziness and  "light-headedness.   Psychiatric/Behavioral:  Negative for agitation.                 Vitals:    11/13/23 1437   BP: (!) 141/105   BP Location: Left arm   Patient Position: Sitting   BP Method: Large (Automatic)   Pulse: 74   Resp: 18   Temp: 97.6 °F (36.4 °C)   TempSrc: Temporal   SpO2: 99%   Weight: 109.8 kg (242 lb)   Height: 5' 3" (1.6 m)        Physical Exam  Vitals and nursing note reviewed.   Constitutional:       Appearance: Normal appearance. She is obese.   HENT:      Head: Normocephalic and atraumatic.      Right Ear: Tympanic membrane, ear canal and external ear normal.      Left Ear: Tympanic membrane, ear canal and external ear normal.      Nose: Nose normal.      Mouth/Throat:      Mouth: Mucous membranes are moist.      Pharynx: Oropharynx is clear.   Eyes:      Extraocular Movements: Extraocular movements intact.      Conjunctiva/sclera: Conjunctivae normal.      Pupils: Pupils are equal, round, and reactive to light.   Cardiovascular:      Rate and Rhythm: Normal rate and regular rhythm.      Pulses: Normal pulses.      Heart sounds: Normal heart sounds.   Pulmonary:      Effort: Pulmonary effort is normal.      Breath sounds: Normal breath sounds.   Abdominal:      General: Abdomen is flat. Bowel sounds are normal.      Palpations: Abdomen is soft.   Musculoskeletal:         General: Normal range of motion.      Cervical back: Normal range of motion and neck supple.   Skin:     General: Skin is warm and dry.   Neurological:      General: No focal deficit present.      Mental Status: She is alert. Mental status is at baseline. She is disoriented.   Psychiatric:         Mood and Affect: Mood normal.         Behavior: Behavior normal.         Thought Content: Thought content normal.         Judgment: Judgment normal.           Last Labs:     No visits with results within 1 Month(s) from this visit.   Latest known visit with results is:   Office Visit on 10/05/2023   Component Date Value    TSH " 10/05/2023 0.710        Last Imaging:  Echo  · The left ventricle is normal in size with normal systolic function.  · Normal right ventricular size with normal right ventricular systolic   function.  · Mild tricuspid regurgitation.  · The estimated ejection fraction is 60%.  · Normal left ventricular diastolic function.  · Normal central venous pressure (3 mmHg).  · The estimated PA systolic pressure is 25 mmHg.            **Labs and x-rays personally reviewed by me    ** reviewed      Objective:        Assessment:       1. Essential (primary) hypertension        2. Anxiety             Plan:         1. Essential (primary) hypertension    2. Anxiety    Other orders  -     busPIRone (BUSPAR) 10 MG tablet; Take 1 tablet (10 mg total) by mouth 3 (three) times daily.  Dispense: 180 tablet; Refill: 1  -     propranoloL (INDERAL) 10 MG tablet; Take 1 tablet (10 mg total) by mouth 3 (three) times daily as needed (anxiety).  Dispense: 90 tablet; Refill: 0

## 2023-12-07 ENCOUNTER — OFFICE VISIT (OUTPATIENT)
Dept: OBSTETRICS AND GYNECOLOGY | Facility: CLINIC | Age: 28
End: 2023-12-07
Payer: COMMERCIAL

## 2023-12-07 VITALS
HEIGHT: 63 IN | BODY MASS INDEX: 42.88 KG/M2 | OXYGEN SATURATION: 99 % | WEIGHT: 242 LBS | RESPIRATION RATE: 17 BRPM | HEART RATE: 79 BPM | TEMPERATURE: 99 F | DIASTOLIC BLOOD PRESSURE: 85 MMHG | SYSTOLIC BLOOD PRESSURE: 138 MMHG

## 2023-12-07 DIAGNOSIS — Z87.42 HISTORY OF ACUTE PID: ICD-10-CM

## 2023-12-07 DIAGNOSIS — Z72.51 HIGH RISK HETEROSEXUAL BEHAVIOR: ICD-10-CM

## 2023-12-07 DIAGNOSIS — Z11.3 ENCOUNTER FOR SCREENING FOR INFECTIONS WITH A PREDOMINANTLY SEXUAL MODE OF TRANSMISSION: ICD-10-CM

## 2023-12-07 DIAGNOSIS — R10.30 LOWER ABDOMINAL PAIN: Primary | ICD-10-CM

## 2023-12-07 LAB
BILIRUB SERPL-MCNC: NEGATIVE MG/DL
BLOOD URINE, POC: NEGATIVE
CANDIDA SPECIES: NEGATIVE
COLOR, POC UA: YELLOW
GARDNERELLA: POSITIVE
GLUCOSE UR QL STRIP: NEGATIVE
KETONES UR QL STRIP: NEGATIVE
LEUKOCYTE ESTERASE URINE, POC: ABNORMAL
NITRITE, POC UA: NEGATIVE
PH, POC UA: 6
PROTEIN, POC: NEGATIVE
SPECIFIC GRAVITY, POC UA: 1.03
TRICHOMONAS: NEGATIVE
UROBILINOGEN, POC UA: 0.2

## 2023-12-07 PROCEDURE — 3075F PR MOST RECENT SYSTOLIC BLOOD PRESS GE 130-139MM HG: ICD-10-PCS | Mod: ,,, | Performed by: ADVANCED PRACTICE MIDWIFE

## 2023-12-07 PROCEDURE — 87591 CHLAMYDIA/GONORRHOEAE(GC), PCR: ICD-10-PCS | Mod: ,,, | Performed by: CLINICAL MEDICAL LABORATORY

## 2023-12-07 PROCEDURE — 3079F PR MOST RECENT DIASTOLIC BLOOD PRESSURE 80-89 MM HG: ICD-10-PCS | Mod: ,,, | Performed by: ADVANCED PRACTICE MIDWIFE

## 2023-12-07 PROCEDURE — 3008F BODY MASS INDEX DOCD: CPT | Mod: ,,, | Performed by: ADVANCED PRACTICE MIDWIFE

## 2023-12-07 PROCEDURE — 87660 BACTERIAL VAGINOSIS: ICD-10-PCS | Mod: ,,, | Performed by: CLINICAL MEDICAL LABORATORY

## 2023-12-07 PROCEDURE — 87510 GARDNER VAG DNA DIR PROBE: CPT | Mod: ,,, | Performed by: CLINICAL MEDICAL LABORATORY

## 2023-12-07 PROCEDURE — 81003 URINALYSIS AUTO W/O SCOPE: CPT | Mod: QW,,, | Performed by: ADVANCED PRACTICE MIDWIFE

## 2023-12-07 PROCEDURE — 87480 CANDIDA DNA DIR PROBE: CPT | Mod: ,,, | Performed by: CLINICAL MEDICAL LABORATORY

## 2023-12-07 PROCEDURE — 81003 POCT URINALYSIS W/O SCOPE: ICD-10-PCS | Mod: QW,,, | Performed by: ADVANCED PRACTICE MIDWIFE

## 2023-12-07 PROCEDURE — 87491 CHLAMYDIA/GONORRHOEAE(GC), PCR: ICD-10-PCS | Mod: ,,, | Performed by: CLINICAL MEDICAL LABORATORY

## 2023-12-07 PROCEDURE — 87491 CHLMYD TRACH DNA AMP PROBE: CPT | Mod: ,,, | Performed by: CLINICAL MEDICAL LABORATORY

## 2023-12-07 PROCEDURE — 99213 OFFICE O/P EST LOW 20 MIN: CPT | Mod: ,,, | Performed by: ADVANCED PRACTICE MIDWIFE

## 2023-12-07 PROCEDURE — 87660 TRICHOMONAS VAGIN DIR PROBE: CPT | Mod: ,,, | Performed by: CLINICAL MEDICAL LABORATORY

## 2023-12-07 PROCEDURE — 3008F PR BODY MASS INDEX (BMI) DOCUMENTED: ICD-10-PCS | Mod: ,,, | Performed by: ADVANCED PRACTICE MIDWIFE

## 2023-12-07 PROCEDURE — 87510 BACTERIAL VAGINOSIS: ICD-10-PCS | Mod: ,,, | Performed by: CLINICAL MEDICAL LABORATORY

## 2023-12-07 PROCEDURE — 99213 PR OFFICE/OUTPT VISIT, EST, LEVL III, 20-29 MIN: ICD-10-PCS | Mod: ,,, | Performed by: ADVANCED PRACTICE MIDWIFE

## 2023-12-07 PROCEDURE — 1159F MED LIST DOCD IN RCRD: CPT | Mod: ,,, | Performed by: ADVANCED PRACTICE MIDWIFE

## 2023-12-07 PROCEDURE — 87480 BACTERIAL VAGINOSIS: ICD-10-PCS | Mod: ,,, | Performed by: CLINICAL MEDICAL LABORATORY

## 2023-12-07 PROCEDURE — 1159F PR MEDICATION LIST DOCUMENTED IN MEDICAL RECORD: ICD-10-PCS | Mod: ,,, | Performed by: ADVANCED PRACTICE MIDWIFE

## 2023-12-07 PROCEDURE — 3075F SYST BP GE 130 - 139MM HG: CPT | Mod: ,,, | Performed by: ADVANCED PRACTICE MIDWIFE

## 2023-12-07 PROCEDURE — 87591 N.GONORRHOEAE DNA AMP PROB: CPT | Mod: ,,, | Performed by: CLINICAL MEDICAL LABORATORY

## 2023-12-07 PROCEDURE — 3079F DIAST BP 80-89 MM HG: CPT | Mod: ,,, | Performed by: ADVANCED PRACTICE MIDWIFE

## 2023-12-07 RX ORDER — FLUCONAZOLE 150 MG/1
150 TABLET ORAL
Qty: 2 TABLET | Refills: 0 | Status: SHIPPED | OUTPATIENT
Start: 2023-12-07 | End: 2023-12-15

## 2023-12-07 RX ORDER — DOXYCYCLINE 100 MG/1
100 CAPSULE ORAL 2 TIMES DAILY
Qty: 14 CAPSULE | Refills: 0 | Status: SHIPPED | OUTPATIENT
Start: 2023-12-07 | End: 2023-12-14

## 2023-12-07 NOTE — PROGRESS NOTES
"Patient ID:  Ashley Gonzalez is a 28 y.o. female      Chief Complaint:   Chief Complaint   Patient presents with    STD CHECK    Pelvic Pain        HPI:  Ashley is in with c/o pelvic pain and requesting STD check. Reports last time she has pelvic pain she had an STD and was dx with PID. Explained pain could be ovulatory in nature as she is having monthly menstrual cycles.   LMP: Patient's last menstrual period was 2023.   Sexually active:  yes  Contraceptive: ocp      Past Medical History:   Diagnosis Date    CAMILA (generalized anxiety disorder)      No past surgical history on file.    OB History          1    Para        Term                AB   1    Living             SAB   1    IAB        Ectopic        Multiple        Live Births                     /85 (BP Location: Right arm)   Pulse 79   Temp 98.5 °F (36.9 °C)   Resp 17   Ht 5' 3" (1.6 m)   Wt 109.8 kg (242 lb)   LMP 2023   SpO2 99%   BMI 42.87 kg/m²   Wt Readings from Last 3 Encounters:   23 109.8 kg (242 lb)   23 109.8 kg (242 lb)   10/05/23 108.9 kg (240 lb)          ROS:  Review of Systems   Constitutional: Negative.    Eyes: Negative.    Respiratory: Negative.     Cardiovascular: Negative.    Gastrointestinal: Negative.    Genitourinary:  Positive for pelvic pain.   Musculoskeletal: Negative.    Neurological: Negative.    Psychiatric/Behavioral: Negative.            PHYSICAL EXAM:  Physical Exam  Constitutional:       Appearance: Normal appearance. She is obese.   Eyes:      Extraocular Movements: Extraocular movements intact.   Cardiovascular:      Rate and Rhythm: Normal rate.      Pulses: Normal pulses.   Pulmonary:      Effort: Pulmonary effort is normal.   Musculoskeletal:         General: Normal range of motion.      Cervical back: Normal range of motion.   Skin:     General: Skin is warm and dry.   Neurological:      Mental Status: She is alert and oriented to person, place, and time. "   Psychiatric:         Mood and Affect: Mood normal.         Behavior: Behavior normal.         Thought Content: Thought content normal.         Judgment: Judgment normal.          Assessment:  Ashley was seen today for std check and pelvic pain.    Diagnoses and all orders for this visit:    Lower abdominal pain  -     POCT URINALYSIS W/O SCOPE  -     fluconazole (DIFLUCAN) 150 MG Tab; Take 1 tablet (150 mg total) by mouth every 7 days. for 2 doses  -     doxycycline (VIBRAMYCIN) 100 MG Cap; Take 1 capsule (100 mg total) by mouth 2 (two) times daily. for 7 days    Encounter for screening for infections with a predominantly sexual mode of transmission  -     Chlamydia/GC, PCR; Future  -     Bacterial Vaginosis; Future  -     Chlamydia/GC, PCR  -     Bacterial Vaginosis    High risk heterosexual behavior  -     Chlamydia/GC, PCR; Future  -     Bacterial Vaginosis; Future  -     Chlamydia/GC, PCR  -     Bacterial Vaginosis    History of acute PID  -     fluconazole (DIFLUCAN) 150 MG Tab; Take 1 tablet (150 mg total) by mouth every 7 days. for 2 doses  -     doxycycline (VIBRAMYCIN) 100 MG Cap; Take 1 capsule (100 mg total) by mouth 2 (two) times daily. for 7 days    BMI 40.0-44.9, adult          ICD-10-CM ICD-9-CM    1. Lower abdominal pain  R10.30 789.09 POCT URINALYSIS W/O SCOPE      fluconazole (DIFLUCAN) 150 MG Tab      doxycycline (VIBRAMYCIN) 100 MG Cap      2. Encounter for screening for infections with a predominantly sexual mode of transmission  Z11.3 V74.5 Chlamydia/GC, PCR      Bacterial Vaginosis      Chlamydia/GC, PCR      Bacterial Vaginosis      3. High risk heterosexual behavior  Z72.51 V69.2 Chlamydia/GC, PCR      Bacterial Vaginosis      Chlamydia/GC, PCR      Bacterial Vaginosis      4. History of acute PID  Z87.42 V13.29 fluconazole (DIFLUCAN) 150 MG Tab      doxycycline (VIBRAMYCIN) 100 MG Cap      5. BMI 40.0-44.9, adult  Z68.41 V85.41           Plan:  U/A negative  Affirm swab self  collected  Urine send for GC/CT testing  Will call or send My Chart message after results reviewed  Encouraged use of condoms with each sexual encounter to decrease STD exposure risk  Rx sent for Doxycycline and Diflucan as prophylaxis treatment  Instructed on use of NSAIDs and Tylenol prn    Follow up for care as needed.

## 2023-12-08 DIAGNOSIS — N76.0 BACTERIAL VAGINAL INFECTION: Primary | ICD-10-CM

## 2023-12-08 DIAGNOSIS — B96.89 BACTERIAL VAGINAL INFECTION: Primary | ICD-10-CM

## 2023-12-08 PROBLEM — Z87.42 HISTORY OF ACUTE PID: Status: ACTIVE | Noted: 2023-12-08

## 2023-12-08 PROBLEM — R10.30 LOWER ABDOMINAL PAIN: Status: ACTIVE | Noted: 2023-12-08

## 2023-12-08 LAB
CHLAMYDIA BY PCR: NEGATIVE
N. GONORRHOEAE (GC) BY PCR: NEGATIVE

## 2023-12-08 RX ORDER — METRONIDAZOLE 500 MG/1
500 TABLET ORAL 2 TIMES DAILY
Qty: 14 TABLET | Refills: 0 | Status: SHIPPED | OUTPATIENT
Start: 2023-12-08 | End: 2023-12-15

## 2023-12-12 ENCOUNTER — TELEPHONE (OUTPATIENT)
Dept: FAMILY MEDICINE | Facility: CLINIC | Age: 28
End: 2023-12-12
Payer: COMMERCIAL

## 2023-12-14 ENCOUNTER — PATIENT MESSAGE (OUTPATIENT)
Dept: OBSTETRICS AND GYNECOLOGY | Facility: CLINIC | Age: 28
End: 2023-12-14
Payer: COMMERCIAL

## 2023-12-14 ENCOUNTER — PATIENT OUTREACH (OUTPATIENT)
Dept: ADMINISTRATIVE | Facility: HOSPITAL | Age: 28
End: 2023-12-14

## 2023-12-14 DIAGNOSIS — Z30.41 SURVEILLANCE OF CONTRACEPTIVE PILL: Primary | ICD-10-CM

## 2023-12-14 NOTE — PROGRESS NOTES
Population Health Chart Review & Patient Outreach Details      Further Action Needed If Patient Returns Outreach:            Updates Requested / Reviewed:     []  Care Everywhere    []     []  External Sources ([facility], [facility], [facility], etc.)    [] [facility]   [] [facility]   [] Other:    []  Care Team Updated   []  Removed  or Duplicate Orders   []  Immunization Reconciliation Completed / Queried    [] [place]   [] [place]   [] [place]   [] [place]      Health Maintenance Topics Addressed and Outreach Outcomes / Actions Taken:             Breast Cancer Screening []  Mammogram Order Placed    []  Mammogram Screening Scheduled    []  External Records Requested & Care Team Updated if Applicable    []  External Records Uploaded & Care Team Updated if Applicable    []  Pt Declined Scheduling Mammogram    []  Pt Will Schedule with External Provider / Order Routed & Care Team Updated if Applicable              Cervical Cancer Screening []  Pap Smear Scheduled in Primary Care or OBGYN    []  External Records Requested & Care Team Updated if Applicable       []  External Records Uploaded, Care Team Updated, & History Updated if Applicable    []  Patient Declined Scheduling Pap Smear    []  Patient Will Schedule with External Provider & Care Team Updated if Applicable                  Colorectal Cancer Screening []  Colonoscopy Case Request / Referral / Home Test Order Placed    []  External Records Requested & Care Team Updated if Applicable    []  External Records Uploaded, Care Team Updated, & History Updated if Applicable    []  Patient Declined Completing Colon Cancer Screening    []  Patient Will Schedule with External Provider & Care Team Updated if Applicable    []  Fit Kit Mailed (add the SmartPhrase under additional notes)    []  Reminded Patient to Complete Home Test                Diabetic Eye Exam []  Eye Exam Screening Order Placed    []  Eye Camera Scheduled or Optometry/Ophthalmology Referral  Placed    []  External Records Requested & Care Team Updated if Applicable    []  External Records Uploaded, Care Team Updated, & History Updated if Applicable    []  Patient Declined Scheduling Eye Exam    []  Patient Will Schedule with External Provider & Care Team Updated if Applicable             Blood Pressure Control []  Primary Care Follow Up Visit Scheduled     []  Remote Blood Pressure Reading Captured    []  Patient Declined Remote Reading or Scheduling Appt - Escalated to PCP    []  Patient Will Call Back or Send Portal Message with Reading                 HbA1c & Other Labs []  Overdue Lab(s) Ordered    []  Overdue Lab(s) Scheduled    []  External Records Uploaded & Care Team Updated if Applicable    []  Primary Care Follow Up Visit Scheduled     []  Reminded Patient to Complete A1c Home Test    []  Patient Declined Scheduling Labs or Will Call Back to Schedule    []  Patient Will Schedule with External Provider / Order Routed, & Care Team Updated if Applicable           Primary Care Appointment []  Primary Care Appt Scheduled    []  Patient Declined Scheduling or Will Call Back to Schedule    []  Pt Established with External Provider, Updated Care Team, & Informed Pt to Notify Payor if Applicable           Medication Adherence /    Statin Use []  Primary Care Appointment Scheduled    []  Patient Reminded to  Prescription    []  Patient Declined, Provider Notified if Needed    []  Sent Provider Message to Review to Evaluate Pt for Statin, Add Exclusion Dx Codes, Document   Exclusion in Problem List, Change Statin Intensity Level to Moderate or High Intensity if Applicable                Osteoporosis Screening []  Dexa Order Placed    []  Dexa Appointment Scheduled    []  External Records Requested & Care Team Updated    []  External Records Uploaded, Care Team Updated, & History Updated if Applicable    []  Patient Declined Scheduling Dexa or Will Call Back to Schedule    []  Patient Will Schedule  with External Provider / Order Routed & Care Team Updated if Applicable       Additional Notes:.  Pt needs appt for HY sent to [facility] to schedule via one note. Pallavi

## 2024-01-22 RX ORDER — PROPRANOLOL HYDROCHLORIDE 10 MG/1
10 TABLET ORAL 3 TIMES DAILY PRN
Qty: 90 TABLET | Refills: 0 | Status: SHIPPED | OUTPATIENT
Start: 2024-01-22 | End: 2024-05-14

## 2024-04-25 ENCOUNTER — OFFICE VISIT (OUTPATIENT)
Dept: FAMILY MEDICINE | Facility: CLINIC | Age: 29
End: 2024-04-25
Payer: COMMERCIAL

## 2024-04-25 VITALS
SYSTOLIC BLOOD PRESSURE: 122 MMHG | WEIGHT: 209 LBS | DIASTOLIC BLOOD PRESSURE: 73 MMHG | OXYGEN SATURATION: 97 % | HEIGHT: 63 IN | TEMPERATURE: 98 F | RESPIRATION RATE: 17 BRPM | HEART RATE: 62 BPM | BODY MASS INDEX: 37.03 KG/M2

## 2024-04-25 DIAGNOSIS — E66.9 CLASS 2 OBESITY WITH BODY MASS INDEX (BMI) OF 37.0 TO 37.9 IN ADULT, UNSPECIFIED OBESITY TYPE, UNSPECIFIED WHETHER SERIOUS COMORBIDITY PRESENT: ICD-10-CM

## 2024-04-25 DIAGNOSIS — E04.1 THYROID NODULE: Primary | ICD-10-CM

## 2024-04-25 LAB
ANION GAP SERPL CALCULATED.3IONS-SCNC: 13 MMOL/L (ref 7–16)
BASOPHILS # BLD AUTO: 0.04 K/UL (ref 0–0.2)
BASOPHILS NFR BLD AUTO: 0.4 % (ref 0–1)
BUN SERPL-MCNC: 11 MG/DL (ref 7–18)
BUN/CREAT SERPL: 17 (ref 6–20)
CALCIUM SERPL-MCNC: 10 MG/DL (ref 8.5–10.1)
CHLORIDE SERPL-SCNC: 103 MMOL/L (ref 98–107)
CHOLEST SERPL-MCNC: 187 MG/DL (ref 0–200)
CHOLEST/HDLC SERPL: 4.8 {RATIO}
CO2 SERPL-SCNC: 27 MMOL/L (ref 21–32)
CREAT SERPL-MCNC: 0.64 MG/DL (ref 0.55–1.02)
DIFFERENTIAL METHOD BLD: ABNORMAL
EGFR (NO RACE VARIABLE) (RUSH/TITUS): 124 ML/MIN/1.73M2
EOSINOPHIL # BLD AUTO: 0.09 K/UL (ref 0–0.5)
EOSINOPHIL NFR BLD AUTO: 0.8 % (ref 1–4)
ERYTHROCYTE [DISTWIDTH] IN BLOOD BY AUTOMATED COUNT: 13.2 % (ref 11.5–14.5)
EST. AVERAGE GLUCOSE BLD GHB EST-MCNC: 91 MG/DL
GLUCOSE SERPL-MCNC: 70 MG/DL (ref 74–106)
HBA1C MFR BLD HPLC: 4.8 % (ref 4.5–6.6)
HCT VFR BLD AUTO: 43.5 % (ref 38–47)
HCV AB SER QL: NORMAL
HDLC SERPL-MCNC: 39 MG/DL (ref 40–60)
HGB BLD-MCNC: 14 G/DL (ref 12–16)
IMM GRANULOCYTES # BLD AUTO: 0.03 K/UL (ref 0–0.04)
IMM GRANULOCYTES NFR BLD: 0.3 % (ref 0–0.4)
LDLC SERPL CALC-MCNC: 133 MG/DL
LDLC/HDLC SERPL: 3.4 {RATIO}
LYMPHOCYTES # BLD AUTO: 4.78 K/UL (ref 1–4.8)
LYMPHOCYTES NFR BLD AUTO: 44.9 % (ref 27–41)
MCH RBC QN AUTO: 30 PG (ref 27–31)
MCHC RBC AUTO-ENTMCNC: 32.2 G/DL (ref 32–36)
MCV RBC AUTO: 93.1 FL (ref 80–96)
MONOCYTES # BLD AUTO: 0.72 K/UL (ref 0–0.8)
MONOCYTES NFR BLD AUTO: 6.8 % (ref 2–6)
MPC BLD CALC-MCNC: 11.4 FL (ref 9.4–12.4)
NEUTROPHILS # BLD AUTO: 4.99 K/UL (ref 1.8–7.7)
NEUTROPHILS NFR BLD AUTO: 46.8 % (ref 53–65)
NONHDLC SERPL-MCNC: 148 MG/DL
NRBC # BLD AUTO: 0 X10E3/UL
NRBC, AUTO (.00): 0 %
PLATELET # BLD AUTO: 326 K/UL (ref 150–400)
POTASSIUM SERPL-SCNC: 4.6 MMOL/L (ref 3.5–5.1)
RBC # BLD AUTO: 4.67 M/UL (ref 4.2–5.4)
SODIUM SERPL-SCNC: 138 MMOL/L (ref 136–145)
T4 FREE SERPL-MCNC: 0.88 NG/DL (ref 0.76–1.46)
T4 SERPL-MCNC: 5.4 ΜG/DL (ref 4.8–13.9)
TRIGL SERPL-MCNC: 74 MG/DL (ref 35–150)
TSH SERPL DL<=0.005 MIU/L-ACNC: 1.03 UIU/ML (ref 0.36–3.74)
VIT B12 SERPL-MCNC: 495 PG/ML (ref 193–986)
VLDLC SERPL-MCNC: 15 MG/DL
WBC # BLD AUTO: 10.65 K/UL (ref 4.5–11)

## 2024-04-25 PROCEDURE — 3078F DIAST BP <80 MM HG: CPT | Mod: CPTII,,, | Performed by: INTERNAL MEDICINE

## 2024-04-25 PROCEDURE — 80048 BASIC METABOLIC PNL TOTAL CA: CPT | Mod: ,,, | Performed by: CLINICAL MEDICAL LABORATORY

## 2024-04-25 PROCEDURE — 83036 HEMOGLOBIN GLYCOSYLATED A1C: CPT | Mod: ,,, | Performed by: CLINICAL MEDICAL LABORATORY

## 2024-04-25 PROCEDURE — 80061 LIPID PANEL: CPT | Mod: ,,, | Performed by: CLINICAL MEDICAL LABORATORY

## 2024-04-25 PROCEDURE — 99214 OFFICE O/P EST MOD 30 MIN: CPT | Mod: ,,, | Performed by: INTERNAL MEDICINE

## 2024-04-25 PROCEDURE — 86803 HEPATITIS C AB TEST: CPT | Mod: ,,, | Performed by: CLINICAL MEDICAL LABORATORY

## 2024-04-25 PROCEDURE — 1159F MED LIST DOCD IN RCRD: CPT | Mod: CPTII,,, | Performed by: INTERNAL MEDICINE

## 2024-04-25 PROCEDURE — 3074F SYST BP LT 130 MM HG: CPT | Mod: CPTII,,, | Performed by: INTERNAL MEDICINE

## 2024-04-25 PROCEDURE — 3008F BODY MASS INDEX DOCD: CPT | Mod: CPTII,,, | Performed by: INTERNAL MEDICINE

## 2024-04-25 PROCEDURE — 85025 COMPLETE CBC W/AUTO DIFF WBC: CPT | Mod: ,,, | Performed by: CLINICAL MEDICAL LABORATORY

## 2024-04-25 PROCEDURE — 84439 ASSAY OF FREE THYROXINE: CPT | Mod: ,,, | Performed by: CLINICAL MEDICAL LABORATORY

## 2024-04-25 PROCEDURE — 82607 VITAMIN B-12: CPT | Mod: ,,, | Performed by: CLINICAL MEDICAL LABORATORY

## 2024-04-25 PROCEDURE — 84443 ASSAY THYROID STIM HORMONE: CPT | Mod: ,,, | Performed by: CLINICAL MEDICAL LABORATORY

## 2024-04-25 PROCEDURE — 3044F HG A1C LEVEL LT 7.0%: CPT | Mod: CPTII,,, | Performed by: INTERNAL MEDICINE

## 2024-04-29 PROBLEM — E04.1 THYROID NODULE: Status: ACTIVE | Noted: 2024-04-29

## 2024-04-29 NOTE — PROGRESS NOTES
"Subjective:       Patient ID: Ashley Gonzalez is a 28 y.o. female.    Chief Complaint: Annual Exam, Fatigue, and Dizziness    HPI  .  Patient presents with chronic obesity, fatigue, weakness patient also has a chronic thyroid nodule.  Will workup the fatigue weakness also we check lipids in his hemoglobin A1c  Recommend diet exercise and lifestyle modification.    Current Medications:    Current Outpatient Medications:     busPIRone (BUSPAR) 10 MG tablet, Take 1 tablet (10 mg total) by mouth 3 (three) times daily., Disp: 180 tablet, Rfl: 1    hydrOXYzine HCL (ATARAX) 25 MG tablet, Take 1 tablet (25 mg total) by mouth 3 (three) times daily as needed for Itching or Anxiety., Disp: 30 tablet, Rfl: 2    norethindrone-ethinyl estradiol (OVCON) 0.4-35 mg-mcg per tablet, Take 1 tablet by mouth once daily., Disp: 28 tablet, Rfl: 6    propranoloL (INDERAL) 10 MG tablet, Take 1 tablet (10 mg total) by mouth 3 (three) times daily as needed (anxiety)., Disp: 90 tablet, Rfl: 0    phentermine (ADIPEX-P) 37.5 mg tablet, Take 1 tablet (37.5 mg total) by mouth once daily. (Patient not taking: Reported on 12/7/2023), Disp: 30 tablet, Rfl: 0    predniSONE (DELTASONE) 5 MG tablet, Take 1 tablet (5 mg total) by mouth once daily. (Patient not taking: Reported on 10/5/2023), Disp: 5 tablet, Rfl: 0           ROS  Twelve point system reviewed, unremarkable except for stated above in HPI.        Objective:         Vitals:    04/25/24 1621   BP: 122/73   BP Location: Right arm   Patient Position: Sitting   BP Method: X-Large (Automatic)   Pulse: 62   Resp: 17   Temp: 97.8 °F (36.6 °C)   TempSrc: Temporal   SpO2: 97%   Weight: 94.8 kg (209 lb)   Height: 5' 3" (1.6 m)        Physical Exam     Patient is awake alert oriented person place and  Lungs are clear to auscultation bilaterally no crackles or wheezes   Cardiovascular S1-S2 regular rate and rhythm no murmurs rubs or gallops   Abdomen is soft positive bowel sounds nontender, extremities no " clubbing cyanosis edema  Neuro no focal neurological deficits  Skin warm and dry.     Last Labs:     Office Visit on 04/25/2024   Component Date Value    Hemoglobin A1C 04/25/2024 4.8     Estimated Average Glucose 04/25/2024 91     Triglycerides 04/25/2024 74     Cholesterol 04/25/2024 187     HDL Cholesterol 04/25/2024 39 (L)     Cholesterol/HDL Ratio (R* 04/25/2024 4.8     Non-HDL 04/25/2024 148     LDL Calculated 04/25/2024 133     LDL/HDL 04/25/2024 3.4     VLDL 04/25/2024 15     Sodium 04/25/2024 138     Potassium 04/25/2024 4.6     Chloride 04/25/2024 103     CO2 04/25/2024 27     Anion Gap 04/25/2024 13     Glucose 04/25/2024 70 (L)     BUN 04/25/2024 11     Creatinine 04/25/2024 0.64     BUN/Creatinine Ratio 04/25/2024 17     Calcium 04/25/2024 10.0     eGFR 04/25/2024 124     TSH 04/25/2024 1.030     Vitamin B12 04/25/2024 495     Total T4 04/25/2024 5.4     Free T4 04/25/2024 0.88     Hepatitis C Ab 04/25/2024 Non-Reactive     WBC 04/25/2024 10.65     RBC 04/25/2024 4.67     Hemoglobin 04/25/2024 14.0     Hematocrit 04/25/2024 43.5     MCV 04/25/2024 93.1     MCH 04/25/2024 30.0     MCHC 04/25/2024 32.2     RDW 04/25/2024 13.2     Platelet Count 04/25/2024 326     MPV 04/25/2024 11.4     Neutrophils % 04/25/2024 46.8 (L)     Lymphocytes % 04/25/2024 44.9 (H)     Monocytes % 04/25/2024 6.8 (H)     Eosinophils % 04/25/2024 0.8 (L)     Basophils % 04/25/2024 0.4     Immature Granulocytes % 04/25/2024 0.3     nRBC, Auto 04/25/2024 0.0     Neutrophils, Abs 04/25/2024 4.99     Lymphocytes, Absolute 04/25/2024 4.78     Monocytes, Absolute 04/25/2024 0.72     Eosinophils, Absolute 04/25/2024 0.09     Basophils, Absolute 04/25/2024 0.04     Immature Granulocytes, A* 04/25/2024 0.03     nRBC, Absolute 04/25/2024 0.00     Diff Type 04/25/2024 Auto        Last Imaging:  Echo  · The left ventricle is normal in size with normal systolic function.  · Normal right ventricular size with normal right ventricular systolic    function.  · Mild tricuspid regurgitation.  · The estimated ejection fraction is 60%.  · Normal left ventricular diastolic function.  · Normal central venous pressure (3 mmHg).  · The estimated PA systolic pressure is 25 mmHg.            **Labs and x-rays personally reviewed by me    ** reviewed           Assessment & Plan:       1. Thyroid nodule  -     Hemoglobin A1C; Future; Expected date: 04/25/2024  -     Lipid Panel; Future; Expected date: 04/25/2024  -     Basic Metabolic Panel; Future; Expected date: 04/25/2024  -     CBC Auto Differential; Future; Expected date: 04/25/2024  -     TSH; Future; Expected date: 04/25/2024  -     Vitamin B12; Future; Expected date: 04/25/2024  -     T4; Future; Expected date: 04/25/2024  -     T4, Free; Future; Expected date: 04/25/2024  -     Hepatitis C Antibody; Future; Expected date: 04/25/2024  -     US Thyroid; Future; Expected date: 04/25/2024    2. Class 2 obesity with body mass index (BMI) of 37.0 to 37.9 in adult, unspecified obesity type, unspecified whether serious comorbidity present            Rick Mason MD    Answers submitted by the patient for this visit:  Review of Systems Questionnaire (Submitted on 4/25/2024)  activity change: No  unexpected weight change: No  neck pain: No  hearing loss: No  rhinorrhea: No  trouble swallowing: No  eye discharge: No  visual disturbance: No  chest tightness: No  wheezing: No  chest pain: No  palpitations: No  blood in stool: No  constipation: Yes  vomiting: No  diarrhea: No  polydipsia: No  polyuria: No  difficulty urinating: No  hematuria: No  menstrual problem: No  dysuria: No  joint swelling: No  arthralgias: No  headaches: No  weakness: Yes  confusion: No  dysphoric mood: No

## 2024-05-13 ENCOUNTER — OFFICE VISIT (OUTPATIENT)
Dept: FAMILY MEDICINE | Facility: CLINIC | Age: 29
End: 2024-05-13
Payer: COMMERCIAL

## 2024-05-13 VITALS
TEMPERATURE: 98 F | RESPIRATION RATE: 18 BRPM | DIASTOLIC BLOOD PRESSURE: 74 MMHG | OXYGEN SATURATION: 96 % | HEIGHT: 63 IN | SYSTOLIC BLOOD PRESSURE: 119 MMHG | WEIGHT: 178 LBS | BODY MASS INDEX: 31.54 KG/M2 | HEART RATE: 77 BPM

## 2024-05-13 DIAGNOSIS — E66.9 CLASS 2 OBESITY WITH BODY MASS INDEX (BMI) OF 35.0 TO 35.9 IN ADULT, UNSPECIFIED OBESITY TYPE, UNSPECIFIED WHETHER SERIOUS COMORBIDITY PRESENT: Primary | ICD-10-CM

## 2024-05-13 PROCEDURE — 3044F HG A1C LEVEL LT 7.0%: CPT | Mod: CPTII,,, | Performed by: INTERNAL MEDICINE

## 2024-05-13 PROCEDURE — 3074F SYST BP LT 130 MM HG: CPT | Mod: CPTII,,, | Performed by: INTERNAL MEDICINE

## 2024-05-13 PROCEDURE — 3078F DIAST BP <80 MM HG: CPT | Mod: CPTII,,, | Performed by: INTERNAL MEDICINE

## 2024-05-13 PROCEDURE — 3008F BODY MASS INDEX DOCD: CPT | Mod: CPTII,,, | Performed by: INTERNAL MEDICINE

## 2024-05-13 PROCEDURE — 99213 OFFICE O/P EST LOW 20 MIN: CPT | Mod: ,,, | Performed by: INTERNAL MEDICINE

## 2024-05-14 ENCOUNTER — OFFICE VISIT (OUTPATIENT)
Dept: FAMILY MEDICINE | Facility: CLINIC | Age: 29
End: 2024-05-14
Payer: COMMERCIAL

## 2024-05-14 VITALS
DIASTOLIC BLOOD PRESSURE: 79 MMHG | OXYGEN SATURATION: 98 % | SYSTOLIC BLOOD PRESSURE: 115 MMHG | HEART RATE: 70 BPM | TEMPERATURE: 98 F | BODY MASS INDEX: 35.61 KG/M2 | WEIGHT: 201 LBS | HEIGHT: 63 IN | RESPIRATION RATE: 18 BRPM

## 2024-05-14 DIAGNOSIS — Z01.419 WOMEN'S ANNUAL ROUTINE GYNECOLOGICAL EXAMINATION: Primary | ICD-10-CM

## 2024-05-14 DIAGNOSIS — Z12.4 ENCOUNTER FOR PAPANICOLAOU SMEAR FOR CERVICAL CANCER SCREENING: ICD-10-CM

## 2024-05-14 DIAGNOSIS — E66.9 OBESITY (BMI 30-39.9): ICD-10-CM

## 2024-05-14 DIAGNOSIS — Z11.3 ENCOUNTER FOR SPECIAL SCREENING EXAMINATION FOR INFECTION WITH PREDOMINANTLY SEXUAL MODE OF TRANSMISSION: ICD-10-CM

## 2024-05-14 DIAGNOSIS — Z72.51 HIGH RISK HETEROSEXUAL BEHAVIOR: ICD-10-CM

## 2024-05-14 LAB
CANDIDA SPECIES: NEGATIVE
GARDNERELLA: POSITIVE
TRICHOMONAS: NEGATIVE

## 2024-05-14 PROCEDURE — 87491 CHLMYD TRACH DNA AMP PROBE: CPT | Mod: ,,, | Performed by: CLINICAL MEDICAL LABORATORY

## 2024-05-14 PROCEDURE — 88142 CYTOPATH C/V THIN LAYER: CPT | Mod: TC,GCY | Performed by: ADVANCED PRACTICE MIDWIFE

## 2024-05-14 PROCEDURE — 87510 GARDNER VAG DNA DIR PROBE: CPT | Mod: ,,, | Performed by: CLINICAL MEDICAL LABORATORY

## 2024-05-14 PROCEDURE — 3078F DIAST BP <80 MM HG: CPT | Mod: CPTII,,, | Performed by: ADVANCED PRACTICE MIDWIFE

## 2024-05-14 PROCEDURE — 3044F HG A1C LEVEL LT 7.0%: CPT | Mod: CPTII,,, | Performed by: ADVANCED PRACTICE MIDWIFE

## 2024-05-14 PROCEDURE — 87480 CANDIDA DNA DIR PROBE: CPT | Mod: ,,, | Performed by: CLINICAL MEDICAL LABORATORY

## 2024-05-14 PROCEDURE — 87591 N.GONORRHOEAE DNA AMP PROB: CPT | Mod: ,,, | Performed by: CLINICAL MEDICAL LABORATORY

## 2024-05-14 PROCEDURE — 1159F MED LIST DOCD IN RCRD: CPT | Mod: CPTII,,, | Performed by: ADVANCED PRACTICE MIDWIFE

## 2024-05-14 PROCEDURE — 99395 PREV VISIT EST AGE 18-39: CPT | Mod: ,,, | Performed by: ADVANCED PRACTICE MIDWIFE

## 2024-05-14 PROCEDURE — 87660 TRICHOMONAS VAGIN DIR PROBE: CPT | Mod: ,,, | Performed by: CLINICAL MEDICAL LABORATORY

## 2024-05-14 PROCEDURE — 3008F BODY MASS INDEX DOCD: CPT | Mod: CPTII,,, | Performed by: ADVANCED PRACTICE MIDWIFE

## 2024-05-14 PROCEDURE — 3074F SYST BP LT 130 MM HG: CPT | Mod: CPTII,,, | Performed by: ADVANCED PRACTICE MIDWIFE

## 2024-05-14 RX ORDER — ASCORBIC ACID 1000 MG
1 TABLET ORAL DAILY
COMMUNITY

## 2024-05-14 RX ORDER — ERGOCALCIFEROL 1.25 MG/1
50000 CAPSULE ORAL
COMMUNITY

## 2024-05-14 RX ORDER — GLUCOSAMINE/CHONDRO SU A 500-400 MG
1 TABLET ORAL 3 TIMES DAILY
COMMUNITY

## 2024-05-14 RX ORDER — AMOXICILLIN 500 MG
2 CAPSULE ORAL DAILY
COMMUNITY

## 2024-05-14 NOTE — PROGRESS NOTES
Patient ID: Ashley Gonzalez is a 28 y.o. female     Chief Complaint:   Chief Complaint   Patient presents with    Gynecologic Exam    Health Maintenance     Birth control: no  Sexually active: yes  STD testing: yest  LMC: 5/6/2024  Last pap: 5 years ago       HPI: Ashley Gonzalez is a 28 y.o. female who presents for well woman exam with Pap.  No issues, problems, or complaints.  Last Pap NILM 1/11/21. Denies ACHES, vaginal discharge, and abnormal vag bleeding.  Desires STD testing.   LMP: Patient's last menstrual period was 05/06/2024 (approximate). Monthly lasting 7-9 days, 3 days heavy bleeding then normal flow/spotting  Sexually active: infrequently, reports recent unprotected encounter   Contraceptive: has rx OCP not using regularly  Mammogram: n/a    Past Medical History:   Diagnosis Date    CAMILA (generalized anxiety disorder)        No past surgical history on file.    Social History     Socioeconomic History    Marital status: Single   Tobacco Use    Smoking status: Former     Types: Vaping with nicotine    Smokeless tobacco: Never   Substance and Sexual Activity    Alcohol use: Never    Drug use: Never    Sexual activity: Yes     Social Determinants of Health     Financial Resource Strain: Low Risk  (4/24/2024)    Overall Financial Resource Strain (CARDIA)     Difficulty of Paying Living Expenses: Not very hard   Food Insecurity: No Food Insecurity (4/24/2024)    Hunger Vital Sign     Worried About Running Out of Food in the Last Year: Never true     Ran Out of Food in the Last Year: Never true   Transportation Needs: No Transportation Needs (4/24/2024)    PRAPARE - Transportation     Lack of Transportation (Medical): No     Lack of Transportation (Non-Medical): No   Physical Activity: Sufficiently Active (4/24/2024)    Exercise Vital Sign     Days of Exercise per Week: 5 days     Minutes of Exercise per Session: 40 min   Stress: Stress Concern Present (4/24/2024)    [employer]  "- Occupational Stress Questionnaire     Feeling of Stress : To some extent   Housing Stability: Unknown (2024)    Housing Stability Vital Sign     Unable to Pay for Housing in the Last Year: No       No family history on file.    OB History          1    Para        Term                AB   1    Living             SAB   1    IAB        Ectopic        Multiple        Live Births                     /79 (BP Location: Left arm, Patient Position: Sitting)   Pulse 70   Temp 98.1 °F (36.7 °C)   Resp 18   Ht 5' 3" (1.6 m)   Wt 91.2 kg (201 lb)   LMP 2024 (Approximate)   SpO2 98%   BMI 35.61 kg/m²     Wt Readings from Last 3 Encounters:   24 91.2 kg (201 lb)   24 80.7 kg (178 lb)   24 94.8 kg (209 lb)        Review of Systems   Constitutional: Negative.    Eyes: Negative.    Respiratory: Negative.     Cardiovascular: Negative.    Gastrointestinal: Negative.    Endocrine: Negative.    Genitourinary: Negative.    Musculoskeletal: Negative.    Integumentary:  Negative.   Neurological: Negative.    Hematological: Negative.    Psychiatric/Behavioral: Negative.     Breast: negative.         Physical Exam   GENERAL: Well-developed, well-nourished female in no acute distress.  NECK: Supple with full range of motion. No adenopathy, masses, or thyromegaly.  SKIN: Normal to inspection with no rashes, lesions, or ulcers.  LUNGS: Clear bilaterally with no rales, rhonchi, or wheezes.   CARDIOVASCULAR AUSCULTATION: Normal heart rate and rhythm.  BREASTS: No masses, lumps, discharge, tenderness, or fibrocystic changes.  LYMPH NODES: No axillary, or inguinal adenopathy.  ABDOMEN: Soft, non tender, without masses. No palpable hernia or hepatosplenomegaly.   VULVA: General appearance normal; external genitalia without lesions or rash.  URETHRA: Normal size and location with no lesions or prolapse.  VAGINA: Mucosa normal, no discharge or lesions.  CERVIX: Pink without lesions, discharge " or tenderness.   UTERUS: Regular, mobile, and non tender;  consistency is normal. Position is mid. Adnexae reveal no evidence of masses, tenderness, or nodularity.  ANUS: No lesions or relaxation.  LOWER EXTREMITIES: No edema or tenderness  PSYCHIATRIC: Patient is oriented to person, place, and time. Mood and affect are normal      Assessment:  Women's annual routine gynecological examination  -     ThinPrep Pap Test; Future; Expected date: 05/14/2024    Encounter for Papanicolaou smear for cervical cancer screening  -     ThinPrep Pap Test; Future; Expected date: 05/14/2024    High risk heterosexual behavior  -     Bacterial Vaginosis; Future; Expected date: 05/14/2024  -     Chlamydia/GC, PCR; Future; Expected date: 05/14/2024    Encounter for special screening examination for infection with predominantly sexual mode of transmission  -     Bacterial Vaginosis; Future; Expected date: 05/14/2024  -     Chlamydia/GC, PCR; Future; Expected date: 05/14/2024    Obesity (BMI 30-39.9)          ICD-10-CM ICD-9-CM    1. Women's annual routine gynecological examination  Z01.419 V72.31 ThinPrep Pap Test      ThinPrep Pap Test      2. Encounter for Papanicolaou smear for cervical cancer screening  Z12.4 V76.2 ThinPrep Pap Test      ThinPrep Pap Test      3. High risk heterosexual behavior  Z72.51 V69.2 Bacterial Vaginosis      Chlamydia/GC, PCR      Bacterial Vaginosis      Chlamydia/GC, PCR      4. Encounter for special screening examination for infection with predominantly sexual mode of transmission  Z11.3 V74.5 Bacterial Vaginosis      Chlamydia/GC, PCR      Bacterial Vaginosis      Chlamydia/GC, PCR      5. Obesity (BMI 30-39.9)  E66.9 278.00           Plan:  Annual exam completed, Pap, Affirm, and GC/CT specimens obtained  Will call or send My Chart message after results reviewed  Patient was counseled today on [facility]P Pap guidelines and recommendations for yearly pelvic exams   Discussed contraceptive options if desires  change from OCP   Encouraged use of condoms with each encounter to decrease STD exposure risk    Follow up in about 1 year (around 5/14/2025) for annual gyn exam.    Answers submitted by the patient for this visit:  Review of Systems Questionnaire (Submitted on 5/9/2024)  activity change: No  unexpected weight change: No  neck pain: No  hearing loss: No  rhinorrhea: No  trouble swallowing: No  eye discharge: No  visual disturbance: No  chest tightness: No  wheezing: No  chest pain: No  palpitations: No  blood in stool: No  constipation: Yes  vomiting: No  diarrhea: No  polydipsia: No  polyuria: No  difficulty urinating: No  hematuria: No  menstrual problem: Yes  dysuria: No  joint swelling: No  arthralgias: No  headaches: No  weakness: Yes  confusion: No  dysphoric mood: Yes

## 2024-05-14 NOTE — PROGRESS NOTES
"Subjective:       Patient ID: Ashley Gonzalez is a 28 y.o. female.    Chief Complaint: Follow-up    HPI  .  Patient seen and evaluated patient is in acute distress patient stated that she is feeling better.  Patient does have evidence of obesity.  Current Medications:    Current Outpatient Medications:     ergocalciferol (ERGOCALCIFEROL) 50,000 unit Cap, Take 50,000 Units by mouth every 7 days., Disp: , Rfl:     ginkgo biloba 40 mg Tab, Take 1 capsule by mouth once daily. Taking for memory/brain function, Disp: , Rfl:     glucosamine-chondroitin 500-400 mg tablet, Take 1 tablet by mouth 3 (three) times daily. Takiing for joint health., Disp: , Rfl:     omega-3 fatty acids/fish oil (FISH OIL-OMEGA-3 FATTY ACIDS) 300-1,000 mg capsule, Take 2 capsules by mouth once daily. Taking for heart health; cholesterol maintance, Disp: , Rfl:            ROS  Twelve point system reviewed, unremarkable except for stated above in HPI.        Objective:         Vitals:    05/13/24 1616   BP: 119/74   BP Location: Right arm   Patient Position: Sitting   BP Method: Large (Automatic)   Pulse: 77   Resp: 18   Temp: 97.5 °F (36.4 °C)   TempSrc: Temporal   SpO2: 96%   Weight: 80.7 kg (178 lb)   Height: 5' 3" (1.6 m)        Physical Exam     Patient is awake alert oriented person place and  Lungs are clear to auscultation bilaterally no crackles or wheezes   Cardiovascular S1-S2 regular rate and rhythm no murmurs rubs or gallops   Abdomen is soft positive bowel sounds nontender, extremities no clubbing cyanosis edema  Neuro no focal neurological deficits  Skin warm and dry.     Last Labs:     Office Visit on 04/25/2024   Component Date Value    Hemoglobin A1C 04/25/2024 4.8     Estimated Average Glucose 04/25/2024 91     Triglycerides 04/25/2024 74     Cholesterol 04/25/2024 187     HDL Cholesterol 04/25/2024 39 (L)     Cholesterol/HDL Ratio (R* 04/25/2024 4.8     Non-HDL 04/25/2024 148     LDL Calculated 04/25/2024 133     LDL/HDL " 04/25/2024 3.4     VLDL 04/25/2024 15     Sodium 04/25/2024 138     Potassium 04/25/2024 4.6     Chloride 04/25/2024 103     CO2 04/25/2024 27     Anion Gap 04/25/2024 13     Glucose 04/25/2024 70 (L)     BUN 04/25/2024 11     Creatinine 04/25/2024 0.64     BUN/Creatinine Ratio 04/25/2024 17     Calcium 04/25/2024 10.0     eGFR 04/25/2024 124     TSH 04/25/2024 1.030     Vitamin B12 04/25/2024 495     Total T4 04/25/2024 5.4     Free T4 04/25/2024 0.88     Hepatitis C Ab 04/25/2024 Non-Reactive     WBC 04/25/2024 10.65     RBC 04/25/2024 4.67     Hemoglobin 04/25/2024 14.0     Hematocrit 04/25/2024 43.5     MCV 04/25/2024 93.1     MCH 04/25/2024 30.0     MCHC 04/25/2024 32.2     RDW 04/25/2024 13.2     Platelet Count 04/25/2024 326     MPV 04/25/2024 11.4     Neutrophils % 04/25/2024 46.8 (L)     Lymphocytes % 04/25/2024 44.9 (H)     Monocytes % 04/25/2024 6.8 (H)     Eosinophils % 04/25/2024 0.8 (L)     Basophils % 04/25/2024 0.4     Immature Granulocytes % 04/25/2024 0.3     nRBC, Auto 04/25/2024 0.0     Neutrophils, Abs 04/25/2024 4.99     Lymphocytes, Absolute 04/25/2024 4.78     Monocytes, Absolute 04/25/2024 0.72     Eosinophils, Absolute 04/25/2024 0.09     Basophils, Absolute 04/25/2024 0.04     Immature Granulocytes, A* 04/25/2024 0.03     nRBC, Absolute 04/25/2024 0.00     Diff Type 04/25/2024 Auto        Last Imaging:  Echo  · The left ventricle is normal in size with normal systolic function.  · Normal right ventricular size with normal right ventricular systolic   function.  · Mild tricuspid regurgitation.  · The estimated ejection fraction is 60%.  · Normal left ventricular diastolic function.  · Normal central venous pressure (3 mmHg).  · The estimated PA systolic pressure is 25 mmHg.            **Labs and x-rays personally reviewed by me    ** reviewed           Assessment & Plan:       1. Class 2 obesity with body mass index (BMI) of 35.0 to 35.9 in adult, unspecified obesity type, unspecified  whether serious comorbidity present      Diet exercise and lifestyle modification      Rick Mason MD

## 2024-05-15 ENCOUNTER — PATIENT MESSAGE (OUTPATIENT)
Dept: FAMILY MEDICINE | Facility: CLINIC | Age: 29
End: 2024-05-15
Payer: COMMERCIAL

## 2024-05-15 DIAGNOSIS — B96.89 BACTERIAL VAGINAL INFECTION: Primary | ICD-10-CM

## 2024-05-15 DIAGNOSIS — N76.0 BACTERIAL VAGINAL INFECTION: Primary | ICD-10-CM

## 2024-05-15 LAB
CHLAMYDIA BY PCR: NEGATIVE
N. GONORRHOEAE (GC) BY PCR: NEGATIVE

## 2024-05-15 RX ORDER — METRONIDAZOLE 500 MG/1
500 TABLET ORAL 2 TIMES DAILY
Qty: 14 TABLET | Refills: 0 | Status: SHIPPED | OUTPATIENT
Start: 2024-05-15 | End: 2024-05-22

## 2024-05-16 LAB
GH SERPL-MCNC: NORMAL NG/ML
INSULIN SERPL-ACNC: NORMAL U[IU]/ML
LAB AP CLINICAL INFORMATION: NORMAL
LAB AP GYN INTERPRETATION: NEGATIVE
LAB AP PAP DISCLAIMER COMMENTS: NORMAL
RENIN PLAS-CCNC: NORMAL NG/ML/H

## 2024-05-29 ENCOUNTER — CLINICAL SUPPORT (OUTPATIENT)
Dept: PRIMARY CARE CLINIC | Facility: CLINIC | Age: 29
End: 2024-05-29
Payer: COMMERCIAL

## 2024-05-29 DIAGNOSIS — Z11.1 SCREENING-PULMONARY TB: Primary | ICD-10-CM

## 2024-05-29 PROCEDURE — 86481 TB AG RESPONSE T-CELL SUSP: CPT | Performed by: NURSE PRACTITIONER

## 2024-05-29 PROCEDURE — 36415 COLL VENOUS BLD VENIPUNCTURE: CPT | Mod: ,,, | Performed by: NURSE PRACTITIONER

## 2024-05-29 NOTE — PROGRESS NOTES
Subjective     Patient ID: Ashley Gonzalez is a 28 y.o. female.    Chief Complaint: No chief complaint on file.    HPI  Review of Systems       Objective     Physical Exam       Assessment and Plan     1. Screening-pulmonary TB  -     Quantiferon Gold TB        TB gold only         No follow-ups on file.

## 2024-06-03 LAB
GAMMA INTERFERON BACKGROUND BLD IA-ACNC: 0.11 [IU]/ML
M TB IFN-G CD4+ BCKGRND COR BLD-ACNC: 0 [IU]/ML
MITOGEN IGNF BCKGRD COR BLD-ACNC: >10 [IU]/ML
QUANTIFERON-TB GOLD PLUS RESULT: NEGATIVE
TB2 AG MINUS NIL RESULT: 0